# Patient Record
Sex: FEMALE | Race: WHITE | NOT HISPANIC OR LATINO | Employment: FULL TIME | URBAN - METROPOLITAN AREA
[De-identification: names, ages, dates, MRNs, and addresses within clinical notes are randomized per-mention and may not be internally consistent; named-entity substitution may affect disease eponyms.]

---

## 2017-12-28 ENCOUNTER — CONVERSION ENCOUNTER (OUTPATIENT)
Dept: GENERAL RADIOLOGY | Facility: HOSPITAL | Age: 52
End: 2017-12-28

## 2019-03-26 ENCOUNTER — HOSPITAL ENCOUNTER (OUTPATIENT)
Dept: GENERAL RADIOLOGY | Facility: HOSPITAL | Age: 54
Discharge: HOME | End: 2019-03-26

## 2021-05-20 ENCOUNTER — OFFICE VISIT CONVERTED (OUTPATIENT)
Dept: FAMILY MEDICINE CLINIC | Facility: CLINIC | Age: 56
End: 2021-05-20
Attending: PHYSICIAN ASSISTANT

## 2021-05-20 ENCOUNTER — CONVERSION ENCOUNTER (OUTPATIENT)
Dept: FAMILY MEDICINE CLINIC | Facility: CLINIC | Age: 56
End: 2021-05-20

## 2021-05-22 ENCOUNTER — TRANSCRIBE ORDERS (OUTPATIENT)
Dept: FAMILY MEDICINE CLINIC | Facility: CLINIC | Age: 56
End: 2021-05-22

## 2021-05-22 DIAGNOSIS — Z12.31 ENCOUNTER FOR SCREENING MAMMOGRAM FOR BREAST CANCER: Primary | ICD-10-CM

## 2021-06-05 NOTE — PROGRESS NOTES
Progress Note      Patient Name: Lisa Florence   Patient ID: [id number]   Sex: Female   YOB: 1965    Primary Care Provider: Praneeth Schmid PA-C   Referring Provider: Praneeth Schmid PA-C    Visit Date: May 20, 2021    Provider: Praneeth Schmid PA-C   Location: [facility]   Location Address: [address] [place] [address], [address]  [place], [place]  [id number]   Location Phone: [phone]          Chief Complaint  · new patient to establish care  · bilateral ear issues      History Of Present Illness  Lisa Florence is a 56 year old /White female who presents for evaluation and treatment of: new patient to establish care.      pt presents today as new patient to establish care.    pt previous pcp was [facility].    pt has h/o allergies, anemia, IBS, depression, HTN, HLD  and sinus issues.    depression-Celexa 20mg, doing well on medication w/no issues.  HLD-Crestor 5mg, no issues w/medication  HTN-maxzide 75-50mg, doing well on medication w/no issues    pt states she was positive for Covid in 2020. extreme nausea and fatigue, no taste and smell.    Labs 3/19-never medical  Moderna- no issues  mammo 3/19-will schd  PAP -  - normal at [facility]    Pt was giving blood every 2 mos.  But became anemic - now its normal  Reviewed labs on her phone from 2021 - normal CBC, CMP, Lipid  PMH of OBS -C, JAMAL, Depression, no SI/HI, Hyperlipidemia, HTN      No tobacco use, etoh use  Surg - lap neeraj, total hyst (no cancer)    FH - lung cancer - father 66yo    NKDA  Colon - pt refuses now.       Past Medical History  Disease Name Date Onset Notes   Allergies --  --    Anemia --  --    Anxiety --  --    Depression --  --    Hemorrhoid --  --    HTN (hypertension) --  --    Hyperlipemia --  --    IBS (irritable bowel syndrome) --  --    Sinus trouble --  --    Skin Disease --  --          Medication List  Name Date Started Instructions   Celexa 20 mg oral tablet  take  "1 tablet (20 mg) by oral route once daily   cetirizine 10 mg oral tablet  take 1 tablet (10 mg) by oral route once daily   Crestor 5 mg oral tablet  take 1 tablet (5 mg) by oral route once daily   Maxzide 75-50 mg oral tablet  take .5 tablet by oral route once daily       Allergies Reconciled  Social History  Finding Status Start/Stop Quantity Notes   Alcohol Never --/-- --  --    Tobacco Never --/-- --  --          Review of Systems  · Constitutional  o Denies  o : fever, fatigue, weight loss, weight gain  · Cardiovascular  o Denies  o : lower extremity edema, claudication, chest pressure, palpitations  · Respiratory  o Denies  o : shortness of breath, wheezing, cough, hemoptysis, dyspnea on exertion  · Gastrointestinal  o Denies  o : nausea, vomiting, diarrhea, constipation, abdominal pain      Vitals  Date Time BP Position Site L\R Cuff Size HR RR TEMP (F) WT  HT  BMI kg/m2 BSA m2 O2 Sat FR L/min FiO2        05/20/2021 08:08 /72 Sitting    67 - R   160lbs 2oz 5'  2\" 29.29 1.78 98 %            Physical Examination  · Constitutional  o Appearance  o : well developed, well-nourished, no acute distress  · Head and Face  o Head  o : normocephalic, atraumatic  · Ears, Nose, Mouth and Throat  o Ears  o :   § External Ears  § : external auditory canal appearance normal, no discharge present  § Otoscopic Examination  § : tympanic membranes pearly white/gray bilaterally  · Neck  o Inspection/Palpation  o : normal appearance, no masses or tenderness, trachea midline  o Thyroid  o : gland size normal, nontender, no nodules or masses present on palpation  · Respiratory  o Respiratory Effort  o : breathing unlabored  o Inspection of Chest  o : chest rise symmetric bilaterally  o Auscultation of Lungs  o : clear to auscultation bilaterally throughout inspiration and expiration  · Cardiovascular  o Heart  o :   § Auscultation of Heart  § : regular rate and rhythm, no murmurs, gallops or rubs  o Peripheral Vascular " System  o :   § Extremities  § : no edema  · Lymphatic  o Neck  o : no cervical lymphadenopathy, no supraclavicular lymphadenopathy  · Psychiatric  o Mood and Affect  o : mood normal, affect appropriate     SKIN - erythematous scaly rash on bilat auricular areas external meatus           Assessment  · Allergic rhinitis due to allergen     477.9/J30.9  · Depression     311/F32.9  · Dermatitis     692.9/L30.9  · Essential hypertension     401.9/I10  · Hyperlipidemia     272.4/E78.5  · Vitamin D deficiency     268.9/E55.9  · Screening for depression     V79.0/Z13.89  · Visit for screening mammogram     V76.12/Z12.31  · Otalgia     388.70/H92.09      Plan  · Orders  o ACO-18: Negative screen for clinical depression using a standardized tool () - V79.0/Z13.89 - 05/20/2021  o Screening Mammography; Bilateral 3D (33203, 03447, ) - V76.12/Z12.31 - 05/20/2021  o ACO-39: Current medications updated and reviewed (1159F, ) - - 05/20/2021  · Medications  o triamcinolone acetonide 0.1 % topical ointment   SIG: apply a thin layer to the affected area(s) by topical route 2 times per day   DISP: (1) Tube with 0 refills  Prescribed on 05/20/2021     o Vitamin D2 1,250 mcg (50,000 unit) oral capsule   SIG: take 1 capsule by oral route once a week for 90 days   DISP: (13) Capsule with 1 refills  Prescribed on 05/20/2021     o Medications have been Reconciled  o Transition of Care or Provider Policy  · Instructions  o Patient advised to monitor blood pressure (B/P) at home and journal readings. Patient informed that a B/P reading at home of more than 130/80 is considered hypertension. For readings greater xbur826/90 or higher patient is advised to follow up in the office with readings for management. Patient advised to limit sodium intake.  o Patient was educated and given low cholesterol diet information.  o Recommended exercise program to assist with cholesterol, weight loss and overall health improvement.  o Advised  that cheeses and other sources of dairy fats, animal fats, fast food, and the extras (candy, pastries, pies, doughnuts and cookies) all contain LDL raising nutrients. Advised to increase fruits, vegetables, whole grains, and to monitor portion sizes.   o Depression Screen completed and scanned into the EMR under the designated folder within the patient's documents.  o Today's PHQ-9 result is _1__  o Take all medications as prescribed/directed.  o Patient instructed/educated on their diet and exercise program.  o Patient was educated/instructed on their diagnosis, treatment and medications prior to discharge from the clinic today.  o Discussed Covid-19 precautions including, but not limited to, social distancing, avoid touching your face, and hand washing.   · Disposition  o Call or Return if symptoms worsen or persist.  o F/U in 6 months  o Care Transition            Electronically Signed by: Praneeth Schmid PA-C -Author on May 20, 2021 08:37:19 PM

## 2021-07-15 VITALS
HEART RATE: 67 BPM | SYSTOLIC BLOOD PRESSURE: 131 MMHG | OXYGEN SATURATION: 98 % | HEIGHT: 62 IN | BODY MASS INDEX: 29.47 KG/M2 | DIASTOLIC BLOOD PRESSURE: 72 MMHG | WEIGHT: 160.12 LBS

## 2021-09-14 ENCOUNTER — HOSPITAL ENCOUNTER (OUTPATIENT)
Dept: MAMMOGRAPHY | Facility: HOSPITAL | Age: 56
Discharge: HOME | End: 2021-09-14
Admitting: PHYSICIAN ASSISTANT

## 2021-09-14 DIAGNOSIS — Z12.31 ENCOUNTER FOR SCREENING MAMMOGRAM FOR BREAST CANCER: ICD-10-CM

## 2021-09-14 PROCEDURE — 77063 BREAST TOMOSYNTHESIS BI: CPT

## 2021-09-14 PROCEDURE — 77067 SCR MAMMO BI INCL CAD: CPT

## 2021-11-22 ENCOUNTER — LAB (OUTPATIENT)
Dept: LAB | Facility: HOSPITAL | Age: 56
End: 2021-11-22

## 2021-11-22 ENCOUNTER — OFFICE VISIT (OUTPATIENT)
Dept: FAMILY MEDICINE CLINIC | Facility: CLINIC | Age: 56
End: 2021-11-22

## 2021-11-22 VITALS
OXYGEN SATURATION: 97 % | HEIGHT: 62 IN | DIASTOLIC BLOOD PRESSURE: 74 MMHG | BODY MASS INDEX: 31.28 KG/M2 | HEART RATE: 67 BPM | SYSTOLIC BLOOD PRESSURE: 118 MMHG | WEIGHT: 170 LBS

## 2021-11-22 DIAGNOSIS — E66.09 CLASS 1 OBESITY DUE TO EXCESS CALORIES WITH SERIOUS COMORBIDITY AND BODY MASS INDEX (BMI) OF 31.0 TO 31.9 IN ADULT: Chronic | ICD-10-CM

## 2021-11-22 DIAGNOSIS — M25.50 POLYARTHRALGIA: ICD-10-CM

## 2021-11-22 DIAGNOSIS — E78.5 HYPERLIPIDEMIA, UNSPECIFIED HYPERLIPIDEMIA TYPE: Primary | Chronic | ICD-10-CM

## 2021-11-22 DIAGNOSIS — L40.9 PSORIASIS: ICD-10-CM

## 2021-11-22 DIAGNOSIS — F41.9 ANXIETY: Chronic | ICD-10-CM

## 2021-11-22 DIAGNOSIS — E78.5 HYPERLIPIDEMIA, UNSPECIFIED HYPERLIPIDEMIA TYPE: Chronic | ICD-10-CM

## 2021-11-22 DIAGNOSIS — Z11.59 ENCOUNTER FOR HEPATITIS C SCREENING TEST FOR LOW RISK PATIENT: ICD-10-CM

## 2021-11-22 DIAGNOSIS — I10 ESSENTIAL HYPERTENSION: ICD-10-CM

## 2021-11-22 DIAGNOSIS — E55.9 VITAMIN D DEFICIENCY: Chronic | ICD-10-CM

## 2021-11-22 DIAGNOSIS — Z23 FLU VACCINE NEED: ICD-10-CM

## 2021-11-22 DIAGNOSIS — Z12.11 SCREENING FOR COLON CANCER: ICD-10-CM

## 2021-11-22 PROBLEM — K58.9 IBS (IRRITABLE BOWEL SYNDROME): Status: ACTIVE | Noted: 2021-11-22

## 2021-11-22 PROBLEM — F32.A DEPRESSION: Status: ACTIVE | Noted: 2021-11-22

## 2021-11-22 PROBLEM — J30.9 ALLERGIC RHINITIS DUE TO ALLERGEN: Status: ACTIVE | Noted: 2021-05-20

## 2021-11-22 PROBLEM — J34.9 SINUS TROUBLE: Status: ACTIVE | Noted: 2021-11-22

## 2021-11-22 PROBLEM — E66.811 CLASS 1 OBESITY DUE TO EXCESS CALORIES WITH SERIOUS COMORBIDITY AND BODY MASS INDEX (BMI) OF 31.0 TO 31.9 IN ADULT: Status: ACTIVE | Noted: 2021-11-22

## 2021-11-22 PROBLEM — E53.8 B12 DEFICIENCY: Status: ACTIVE | Noted: 2021-11-22

## 2021-11-22 PROBLEM — D64.9 ANEMIA: Status: ACTIVE | Noted: 2021-11-22

## 2021-11-22 LAB
25(OH)D3 SERPL-MCNC: 66.5 NG/ML (ref 30–100)
ALBUMIN SERPL-MCNC: 4 G/DL (ref 3.5–5.2)
ALBUMIN/GLOB SERPL: 1.3 G/DL
ALP SERPL-CCNC: 107 U/L (ref 39–117)
ALT SERPL W P-5'-P-CCNC: 32 U/L (ref 1–33)
ANION GAP SERPL CALCULATED.3IONS-SCNC: 11.9 MMOL/L (ref 5–15)
AST SERPL-CCNC: 33 U/L (ref 1–32)
BASOPHILS # BLD AUTO: 0.03 10*3/MM3 (ref 0–0.2)
BASOPHILS NFR BLD AUTO: 0.5 % (ref 0–1.5)
BILIRUB SERPL-MCNC: 0.3 MG/DL (ref 0–1.2)
BILIRUB UR QL STRIP: NEGATIVE
BUN SERPL-MCNC: 23 MG/DL (ref 6–20)
BUN/CREAT SERPL: 27.1 (ref 7–25)
CALCIUM SPEC-SCNC: 9.4 MG/DL (ref 8.6–10.5)
CHLORIDE SERPL-SCNC: 97 MMOL/L (ref 98–107)
CHOLEST SERPL-MCNC: 180 MG/DL (ref 0–200)
CHROMATIN AB SERPL-ACNC: <10 IU/ML (ref 0–14)
CLARITY UR: CLEAR
CO2 SERPL-SCNC: 27.1 MMOL/L (ref 22–29)
COLOR UR: YELLOW
CREAT SERPL-MCNC: 0.85 MG/DL (ref 0.57–1)
CRP SERPL-MCNC: <0.3 MG/DL (ref 0–0.5)
DEPRECATED RDW RBC AUTO: 40.5 FL (ref 37–54)
EOSINOPHIL # BLD AUTO: 0.12 10*3/MM3 (ref 0–0.4)
EOSINOPHIL NFR BLD AUTO: 1.9 % (ref 0.3–6.2)
ERYTHROCYTE [DISTWIDTH] IN BLOOD BY AUTOMATED COUNT: 12.9 % (ref 12.3–15.4)
GFR SERPL CREATININE-BSD FRML MDRD: 69 ML/MIN/1.73
GLOBULIN UR ELPH-MCNC: 3.2 GM/DL
GLUCOSE SERPL-MCNC: 98 MG/DL (ref 65–99)
GLUCOSE UR STRIP-MCNC: NEGATIVE MG/DL
HCT VFR BLD AUTO: 42.6 % (ref 34–46.6)
HCV AB SER DONR QL: NORMAL
HDLC SERPL-MCNC: 63 MG/DL (ref 40–60)
HGB BLD-MCNC: 13.8 G/DL (ref 12–15.9)
HGB UR QL STRIP.AUTO: NEGATIVE
IMM GRANULOCYTES # BLD AUTO: 0.01 10*3/MM3 (ref 0–0.05)
IMM GRANULOCYTES NFR BLD AUTO: 0.2 % (ref 0–0.5)
KETONES UR QL STRIP: NEGATIVE
LDLC SERPL CALC-MCNC: 108 MG/DL (ref 0–100)
LDLC/HDLC SERPL: 1.72 {RATIO}
LEUKOCYTE ESTERASE UR QL STRIP.AUTO: NEGATIVE
LYMPHOCYTES # BLD AUTO: 2.3 10*3/MM3 (ref 0.7–3.1)
LYMPHOCYTES NFR BLD AUTO: 37.2 % (ref 19.6–45.3)
MCH RBC QN AUTO: 28.1 PG (ref 26.6–33)
MCHC RBC AUTO-ENTMCNC: 32.4 G/DL (ref 31.5–35.7)
MCV RBC AUTO: 86.8 FL (ref 79–97)
MONOCYTES # BLD AUTO: 0.35 10*3/MM3 (ref 0.1–0.9)
MONOCYTES NFR BLD AUTO: 5.7 % (ref 5–12)
NEUTROPHILS NFR BLD AUTO: 3.37 10*3/MM3 (ref 1.7–7)
NEUTROPHILS NFR BLD AUTO: 54.5 % (ref 42.7–76)
NITRITE UR QL STRIP: NEGATIVE
NRBC BLD AUTO-RTO: 0 /100 WBC (ref 0–0.2)
PH UR STRIP.AUTO: 7 [PH] (ref 5–8)
PLATELET # BLD AUTO: 342 10*3/MM3 (ref 140–450)
PMV BLD AUTO: 10 FL (ref 6–12)
POTASSIUM SERPL-SCNC: 4.2 MMOL/L (ref 3.5–5.2)
PROT SERPL-MCNC: 7.2 G/DL (ref 6–8.5)
PROT UR QL STRIP: NEGATIVE
RBC # BLD AUTO: 4.91 10*6/MM3 (ref 3.77–5.28)
SODIUM SERPL-SCNC: 136 MMOL/L (ref 136–145)
SP GR UR STRIP: 1.02 (ref 1–1.03)
TRIGL SERPL-MCNC: 42 MG/DL (ref 0–150)
TSH SERPL DL<=0.05 MIU/L-ACNC: 1.62 UIU/ML (ref 0.27–4.2)
URATE SERPL-MCNC: 5.6 MG/DL (ref 2.4–5.7)
UROBILINOGEN UR QL STRIP: NORMAL
VLDLC SERPL-MCNC: 9 MG/DL (ref 5–40)
WBC NRBC COR # BLD: 6.18 10*3/MM3 (ref 3.4–10.8)

## 2021-11-22 PROCEDURE — 86431 RHEUMATOID FACTOR QUANT: CPT

## 2021-11-22 PROCEDURE — 82306 VITAMIN D 25 HYDROXY: CPT

## 2021-11-22 PROCEDURE — 80053 COMPREHEN METABOLIC PANEL: CPT

## 2021-11-22 PROCEDURE — 90686 IIV4 VACC NO PRSV 0.5 ML IM: CPT | Performed by: PHYSICIAN ASSISTANT

## 2021-11-22 PROCEDURE — 84443 ASSAY THYROID STIM HORMONE: CPT

## 2021-11-22 PROCEDURE — 85025 COMPLETE CBC W/AUTO DIFF WBC: CPT

## 2021-11-22 PROCEDURE — 84550 ASSAY OF BLOOD/URIC ACID: CPT

## 2021-11-22 PROCEDURE — 90471 IMMUNIZATION ADMIN: CPT | Performed by: PHYSICIAN ASSISTANT

## 2021-11-22 PROCEDURE — 36415 COLL VENOUS BLD VENIPUNCTURE: CPT

## 2021-11-22 PROCEDURE — 86225 DNA ANTIBODY NATIVE: CPT

## 2021-11-22 PROCEDURE — 80061 LIPID PANEL: CPT

## 2021-11-22 PROCEDURE — 86038 ANTINUCLEAR ANTIBODIES: CPT

## 2021-11-22 PROCEDURE — 81003 URINALYSIS AUTO W/O SCOPE: CPT

## 2021-11-22 PROCEDURE — 86063 ANTISTREPTOLYSIN O SCREEN: CPT

## 2021-11-22 PROCEDURE — 86140 C-REACTIVE PROTEIN: CPT

## 2021-11-22 PROCEDURE — 99214 OFFICE O/P EST MOD 30 MIN: CPT | Performed by: PHYSICIAN ASSISTANT

## 2021-11-22 PROCEDURE — 86803 HEPATITIS C AB TEST: CPT

## 2021-11-22 RX ORDER — TRIAMTERENE AND HYDROCHLOROTHIAZIDE 75; 50 MG/1; MG/1
0.5 TABLET ORAL DAILY
Qty: 90 TABLET | Refills: 1 | Status: SHIPPED | OUTPATIENT
Start: 2021-11-22 | End: 2023-01-23

## 2021-11-22 RX ORDER — ERGOCALCIFEROL 1.25 MG/1
50000 CAPSULE ORAL
Qty: 90 CAPSULE | Refills: 1 | Status: SHIPPED | OUTPATIENT
Start: 2021-11-22 | End: 2023-01-23

## 2021-11-22 RX ORDER — ROSUVASTATIN CALCIUM 5 MG/1
5 TABLET, COATED ORAL DAILY
Qty: 90 TABLET | Refills: 1 | Status: SHIPPED | OUTPATIENT
Start: 2021-11-22 | End: 2021-11-22 | Stop reason: SDUPTHER

## 2021-11-22 RX ORDER — TRIAMTERENE AND HYDROCHLOROTHIAZIDE 75; 50 MG/1; MG/1
0.5 TABLET ORAL DAILY
COMMUNITY
End: 2021-11-22 | Stop reason: SDUPTHER

## 2021-11-22 RX ORDER — ERGOCALCIFEROL 1.25 MG/1
50000 CAPSULE ORAL
COMMUNITY
End: 2021-11-22 | Stop reason: SDUPTHER

## 2021-11-22 RX ORDER — ROSUVASTATIN CALCIUM 5 MG/1
5 TABLET, COATED ORAL DAILY
Qty: 90 TABLET | Refills: 1 | Status: SHIPPED | OUTPATIENT
Start: 2021-11-22 | End: 2023-01-23

## 2021-11-22 RX ORDER — CITALOPRAM 10 MG/1
10 TABLET ORAL DAILY
Qty: 90 TABLET | Refills: 1 | Status: SHIPPED | OUTPATIENT
Start: 2021-11-22 | End: 2022-04-14

## 2021-11-22 RX ORDER — TRIAMTERENE AND HYDROCHLOROTHIAZIDE 75; 50 MG/1; MG/1
0.5 TABLET ORAL DAILY
Qty: 90 TABLET | Refills: 1 | Status: SHIPPED | OUTPATIENT
Start: 2021-11-22 | End: 2021-11-22 | Stop reason: SDUPTHER

## 2021-11-22 RX ORDER — ERGOCALCIFEROL 1.25 MG/1
50000 CAPSULE ORAL
Qty: 90 CAPSULE | Refills: 1 | Status: SHIPPED | OUTPATIENT
Start: 2021-11-22 | End: 2021-11-22 | Stop reason: SDUPTHER

## 2021-11-22 RX ORDER — ROSUVASTATIN CALCIUM 5 MG/1
5 TABLET, COATED ORAL DAILY
COMMUNITY
End: 2021-11-22 | Stop reason: SDUPTHER

## 2021-11-22 RX ORDER — CETIRIZINE HYDROCHLORIDE 10 MG/1
10 TABLET ORAL DAILY
COMMUNITY
End: 2021-11-22

## 2021-11-22 RX ORDER — CITALOPRAM 20 MG/1
1 TABLET ORAL DAILY
COMMUNITY
End: 2021-11-22

## 2021-11-22 NOTE — PROGRESS NOTES
"Chief Complaint  Hypertension (6 month follow up), Hyperlipidemia, Anxiety, and Depression    Subjective          Lisa Alexandra presents to [facility]  History of Present Illness    Lisa Alexandra is a 56 y.o. female who presents today for a 6 month follow up HTN, HLD, Anxiety, Depression    Pt offers no complaints at this time, she states she is doing well. She is due refills, labs and colonoscopy. Pt has opted for Cologuard instead.     Pt would like to discuss lowering dose of Celexa down to 10mg, she has taken this in the past. Pt is currently on 20mg once daily, she stated she is doing well and doesn't need higher dose.     Labs- due  Mammo-9/2021    Pt will receive flu vaccine in office today.     Answers for HPI/ROS submitted by the patient on 11/15/2021  What is the primary reason for your visit?: Physical      Current Outpatient Medications:   •  ergocalciferol (ERGOCALCIFEROL) 1.25 MG (00502 UT) capsule, Take 1 capsule by mouth Every 7 (Seven) Days., Disp: 90 capsule, Rfl: 1  •  rosuvastatin (Crestor) 5 MG tablet, Take 1 tablet by mouth Daily., Disp: 90 tablet, Rfl: 1  •  triamterene-hydrochlorothiazide (Maxzide) 75-50 MG per tablet, Take 0.5 tablets by mouth Daily., Disp: 90 tablet, Rfl: 1  •  citalopram (CeleXA) 10 MG tablet, Take 1 tablet by mouth Daily., Disp: 90 tablet, Rfl: 1    Objective      Vital Signs:   /74 (BP Location: Left arm)   Pulse 67   Ht 157.5 cm (62\")   Wt 77.1 kg (170 lb)   SpO2 97%   BMI 31.09 kg/m²    Estimated body mass index is 31.09 kg/m² as calculated from the following:    Height as of this encounter: 157.5 cm (62\").    Weight as of this encounter: 77.1 kg (170 lb).     Physical Exam  Vitals and nursing note reviewed.   Constitutional:       Appearance: Normal appearance.   HENT:      Head: Normocephalic and atraumatic.   Cardiovascular:      Rate and Rhythm: Normal rate and regular rhythm.   Pulmonary:      Effort: Pulmonary " effort is normal.      Breath sounds: Normal breath sounds.   Musculoskeletal:      Cervical back: Neck supple.   Skin:     Comments: Skin - erythematous plaque on bilat olecranon process   Neurological:      Mental Status: She is alert.   Psychiatric:         Mood and Affect: Mood normal.         Behavior: Behavior normal.          Result Review :                       Assessment and Plan      Diagnoses and all orders for this visit:    1. Hyperlipidemia, unspecified hyperlipidemia type (Primary)  Comments:  Stable on crestor 5mg daily  Orders:  -     Discontinue: rosuvastatin (Crestor) 5 MG tablet; Take 1 tablet by mouth Daily.  Dispense: 90 tablet; Refill: 1  -     rosuvastatin (Crestor) 5 MG tablet; Take 1 tablet by mouth Daily.  Dispense: 90 tablet; Refill: 1  -     Comprehensive Metabolic Panel; Future  -     Lipid Panel; Future  -     TSH Rfx On Abnormal To Free T4; Future    2. Flu vaccine need  -     FluLaval/Fluarix/Fluzone >6 Months (7904-1504)    3. Screening for colon cancer  -     Cologuard - Stool, Per Rectum; Future    4. Essential hypertension  -     Discontinue: triamterene-hydrochlorothiazide (Maxzide) 75-50 MG per tablet; Take 0.5 tablets by mouth Daily.  Dispense: 90 tablet; Refill: 1  -     triamterene-hydrochlorothiazide (Maxzide) 75-50 MG per tablet; Take 0.5 tablets by mouth Daily.  Dispense: 90 tablet; Refill: 1  -     CBC & Differential; Future  -     Comprehensive Metabolic Panel; Future  -     Lipid Panel; Future  -     TSH Rfx On Abnormal To Free T4; Future  -     Urinalysis With Microscopic If Indicated (No Culture) - Urine, Clean Catch; Future    5. Vitamin D deficiency  Comments:  Stable on vit D 50,000 IU weekly  Orders:  -     Discontinue: ergocalciferol (ERGOCALCIFEROL) 1.25 MG (50371 UT) capsule; Take 1 capsule by mouth Every 7 (Seven) Days.  Dispense: 90 capsule; Refill: 1  -     ergocalciferol (ERGOCALCIFEROL) 1.25 MG (09336 UT) capsule; Take 1 capsule by mouth Every 7 (Seven)  Days.  Dispense: 90 capsule; Refill: 1  -     Vitamin D 25 Hydroxy; Future    6. Class 1 obesity due to excess calories with serious comorbidity and body mass index (BMI) of 31.0 to 31.9 in adult  Comments:  Disc diet and exercise    7. Anxiety  Comments:  Decrease dose of celexa from 20 to 10mg daily  Orders:  -     citalopram (CeleXA) 10 MG tablet; Take 1 tablet by mouth Daily.  Dispense: 90 tablet; Refill: 1    8. Psoriasis  -     Ambulatory Referral to Rheumatology  -     CBC & Differential; Future  -     Uric acid; Future  -     Antistreptolysin O screen; Future  -     Rheumatoid factor; Future  -     C-reactive protein; Future  -     REY; Future    9. Polyarthralgia  -     CBC & Differential; Future  -     Uric acid; Future  -     Antistreptolysin O screen; Future  -     Rheumatoid factor; Future  -     C-reactive protein; Future  -     REY; Future    10. Encounter for hepatitis C screening test for low risk patient  -     Hepatitis C antibody; Future       Follow Up     Return in about 6 months (around 5/22/2022).    I have reviewed information obtained and documented by others and I have confirmed the accuracy of this documented note.     Patient was given instructions and counseling regarding her condition or for health maintenance advice. Please see specific information pulled into the AVS if appropriate.     MADHU Bang

## 2021-11-23 ENCOUNTER — TELEPHONE (OUTPATIENT)
Dept: FAMILY MEDICINE CLINIC | Facility: CLINIC | Age: 56
End: 2021-11-23

## 2021-11-23 LAB
ASO AB SERPL-ACNC: NEGATIVE [IU]/ML
DSDNA IGG SERPL IA-ACNC: NEGATIVE [IU]/ML
NUCLEAR IGG SER IA-RTO: NEGATIVE

## 2021-12-21 ENCOUNTER — TELEPHONE (OUTPATIENT)
Dept: FAMILY MEDICINE CLINIC | Facility: CLINIC | Age: 56
End: 2021-12-21

## 2022-04-14 DIAGNOSIS — F41.9 ANXIETY: Chronic | ICD-10-CM

## 2022-04-14 RX ORDER — CITALOPRAM 10 MG/1
TABLET ORAL
Qty: 90 TABLET | Refills: 0 | Status: SHIPPED | OUTPATIENT
Start: 2022-04-14

## 2022-12-21 ENCOUNTER — TRANSCRIBE ORDERS (OUTPATIENT)
Dept: ADMINISTRATIVE | Facility: HOSPITAL | Age: 57
End: 2022-12-21

## 2022-12-21 DIAGNOSIS — Z78.0 POST-MENOPAUSAL: Primary | ICD-10-CM

## 2022-12-21 DIAGNOSIS — Z12.31 ENCOUNTER FOR SCREENING MAMMOGRAM FOR MALIGNANT NEOPLASM OF BREAST: ICD-10-CM

## 2023-01-24 ENCOUNTER — TELEPHONE (OUTPATIENT)
Dept: ORTHOPEDIC SURGERY | Facility: CLINIC | Age: 58
End: 2023-01-24
Payer: COMMERCIAL

## 2023-01-24 NOTE — TELEPHONE ENCOUNTER
PT SEEN AT [facility] 01/23      PLEASE, ADVISE IT PT CAN BE SEEN Friday OR IF THEY NEED TO COME IN TOMORROW

## 2023-01-27 ENCOUNTER — OFFICE VISIT (OUTPATIENT)
Dept: ORTHOPEDIC SURGERY | Facility: CLINIC | Age: 58
End: 2023-01-27
Payer: COMMERCIAL

## 2023-01-27 VITALS — HEART RATE: 94 BPM | HEIGHT: 62 IN | WEIGHT: 163 LBS | BODY MASS INDEX: 30 KG/M2 | OXYGEN SATURATION: 96 %

## 2023-01-27 DIAGNOSIS — S89.91XA INJURY OF RIGHT KNEE, INITIAL ENCOUNTER: ICD-10-CM

## 2023-01-27 DIAGNOSIS — S83.241A ACUTE MEDIAL MENISCUS TEAR OF RIGHT KNEE, INITIAL ENCOUNTER: Primary | ICD-10-CM

## 2023-01-27 PROCEDURE — 99203 OFFICE O/P NEW LOW 30 MIN: CPT | Performed by: STUDENT IN AN ORGANIZED HEALTH CARE EDUCATION/TRAINING PROGRAM

## 2023-01-27 NOTE — PROGRESS NOTES
"Chief Complaint  Initial Evaluation and Pain of the Right Knee    Subjective          Lisa Alexandra presents to [facility] for   History of Present Illness    Lisa presents today for evaluation of her right knee.  She describes worsening right knee pain over the last few weeks.  She had an episode where the knee popped along the posterior joint line while exercising.  She has a long history of competitive power lifting.  She denies any prior knee trauma or surgeries.  She does describe more chronic anterior type knee pain that is different from her more acute posterior symptoms on the right knee.    No Known Allergies     Social History     Socioeconomic History   • Marital status:    Tobacco Use   • Smoking status: Never   • Smokeless tobacco: Never   Vaping Use   • Vaping Use: Never used   Substance and Sexual Activity   • Alcohol use: Yes     Alcohol/week: 1.0 standard drink     Types: 1 Glasses of wine per week   • Drug use: Never   • Sexual activity: Not Currently     Partners: Male     Birth control/protection: Post-menopausal, Hysterectomy        I reviewed the patient's chief complaint, history of present illness, review of systems, past medical history, surgical history, family history, social history, medications, and allergy list.     REVIEW OF SYSTEMS    Constitutional: Denies fevers, chills, weight loss  Cardiovascular: Denies chest pain, shortness of breath  Skin: Denies rashes, acute skin changes  Neurologic: Denies headache, loss of consciousness  MSK: Right knee pain      Objective   Vital Signs:   Pulse 94   Ht 157.5 cm (62\")   Wt 73.9 kg (163 lb)   SpO2 96%   BMI 29.81 kg/m²     Body mass index is 29.81 kg/m².    Physical Exam    General: Alert. No acute distress.   Right lower extremity: No wounds.  No effusion.  Full extension, flexion to 120 degrees with terminal pain.  Stable to varus and valgus stress.  Stable to Lachman and posterior drawer.  Medial " joint tenderness.  Pain with Marisel's along the medial joint line.  Calf soft.  Distal neurovascular intact.  No pain with hip motion.    Procedures    Imaging Results (Most Recent)     None                   Assessment and Plan        XR Knee 3 View Right    Result Date: 1/23/2023  Narrative: PROCEDURE: XR KNEE 3 VW RIGHT  COMPARISON: None  INDICATIONS: felt a pop in her right knee - posterior/lateral - hurst to bear weight  FINDINGS:  There is no acute fracture or dislocation.  No bony erosion or abnormal periosteal reaction.  No joint effusion.  Soft tissues are unremarkable.      Impression:   1. No acute bony abnormality or significant degenerative change of the right knee.      GISELE HUTTON MD       Electronically Signed and Approved By: GISELE HUTTON MD on 1/23/2023 at 19:24                Diagnoses and all orders for this visit:    1. Acute medial meniscus tear of right knee, initial encounter (Primary)  -     MRI Knee Right Without Contrast; Future    2. Injury of right knee, initial encounter  -     MRI Knee Right Without Contrast; Future        We reviewed the x-rays obtained shortly after her injury.  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  Her current symptoms are more consistent with a medial meniscus tear after her weightlifting injury.  We discussed advanced imaging with an MRI and she was agreeable.  She will follow-up after the MRI to discuss results and additional treatment options.        Call or return if worsening symptoms.    Scribed for Jesse Larson MD by Sejal Holman  01/27/2023   08:45 EST         Follow Up       MRI results    Patient was given instructions and counseling regarding her condition or for health maintenance advice. Please see specific information pulled into the AVS if appropriate.       I have personally performed the services described in this document as scribed by the above individual and it is both accurate and complete.     Jesse Larson  MD  01/27/23  10:25 EST

## 2023-02-08 ENCOUNTER — TELEPHONE (OUTPATIENT)
Dept: ORTHOPEDIC SURGERY | Facility: CLINIC | Age: 58
End: 2023-02-08
Payer: COMMERCIAL

## 2023-02-08 DIAGNOSIS — S89.91XA INJURY OF RIGHT KNEE, INITIAL ENCOUNTER: ICD-10-CM

## 2023-02-08 DIAGNOSIS — S83.241A ACUTE MEDIAL MENISCUS TEAR OF RIGHT KNEE, INITIAL ENCOUNTER: ICD-10-CM

## 2023-02-10 ENCOUNTER — OFFICE VISIT (OUTPATIENT)
Dept: ORTHOPEDIC SURGERY | Facility: CLINIC | Age: 58
End: 2023-02-10
Payer: COMMERCIAL

## 2023-02-10 ENCOUNTER — PREP FOR SURGERY (OUTPATIENT)
Dept: OTHER | Facility: HOSPITAL | Age: 58
End: 2023-02-10
Payer: COMMERCIAL

## 2023-02-10 VITALS — HEART RATE: 63 BPM | HEIGHT: 62 IN | BODY MASS INDEX: 29.44 KG/M2 | OXYGEN SATURATION: 98 % | WEIGHT: 160 LBS

## 2023-02-10 DIAGNOSIS — S83.241A ACUTE MEDIAL MENISCUS TEAR OF RIGHT KNEE, INITIAL ENCOUNTER: Primary | ICD-10-CM

## 2023-02-10 DIAGNOSIS — M94.20 CHONDROMALACIA: ICD-10-CM

## 2023-02-10 PROCEDURE — 99213 OFFICE O/P EST LOW 20 MIN: CPT | Performed by: STUDENT IN AN ORGANIZED HEALTH CARE EDUCATION/TRAINING PROGRAM

## 2023-02-10 RX ORDER — CEFAZOLIN SODIUM 2 G/100ML
2 INJECTION, SOLUTION INTRAVENOUS ONCE
Status: CANCELLED | OUTPATIENT
Start: 2023-02-10 | End: 2023-02-10

## 2023-02-10 RX ORDER — CEFAZOLIN SODIUM IN 0.9 % NACL 3 G/100 ML
3 INTRAVENOUS SOLUTION, PIGGYBACK (ML) INTRAVENOUS ONCE
Status: CANCELLED | OUTPATIENT
Start: 2023-02-10 | End: 2023-02-10

## 2023-02-10 NOTE — PROGRESS NOTES
"Chief Complaint  Follow-up and Pain of the Right Knee    Subjective          Lisa Alexandra presents to [facility] for   History of Present Illness    Lisa returns for follow-up of her right knee.  To review she had a injury to the knee while exercising with a popping sensation along the posterior knee.  Her initial history and exam are concerning for a medial meniscus tear.  An MRI was ordered and obtained.  She returns today to discuss MRI results.      No Known Allergies     Social History     Socioeconomic History   • Marital status:    Tobacco Use   • Smoking status: Never   • Smokeless tobacco: Never   Vaping Use   • Vaping Use: Never used   Substance and Sexual Activity   • Alcohol use: Yes     Alcohol/week: 1.0 standard drink     Types: 1 Glasses of wine per week   • Drug use: Never   • Sexual activity: Not Currently     Partners: Male     Birth control/protection: Post-menopausal, Hysterectomy        I reviewed the patient's chief complaint, history of present illness, review of systems, past medical history, surgical history, family history, social history, medications, and allergy list.     REVIEW OF SYSTEMS    Constitutional: Denies fevers, chills, weight loss  Cardiovascular: Denies chest pain, shortness of breath  Skin: Denies rashes, acute skin changes  Neurologic: Denies headache, loss of consciousness  MSK: Right knee pain      Objective   Vital Signs:   Pulse 63   Ht 157.5 cm (62\")   Wt 72.6 kg (160 lb)   SpO2 98%   BMI 29.26 kg/m²     Body mass index is 29.26 kg/m².    Physical Exam    General: Alert. No acute distress.   Cardiac: Intact peripheral pulses  Pulmonary: Nonlabored respirations  Right lower extremity: Skin intact.  No effusion.  Full extension.  120 degrees of flexion.  Stable to varus and valgus stress.  Stable Lachman and posterior drawer.  Medial joint line tenderness and pain with Marisel's along the medial joint line.  Calf soft.  Distal " neurovascular intact.    Procedures    Imaging Results (Most Recent)     None                   Assessment and Plan        XR Knee 3 View Right    Result Date: 1/23/2023  Narrative: PROCEDURE: XR KNEE 3 VW RIGHT  COMPARISON: None  INDICATIONS: felt a pop in her right knee - posterior/lateral - hurst to bear weight  FINDINGS:  There is no acute fracture or dislocation.  No bony erosion or abnormal periosteal reaction.  No joint effusion.  Soft tissues are unremarkable.      Impression:   1. No acute bony abnormality or significant degenerative change of the right knee.      GISELE HUTTON MD       Electronically Signed and Approved By: GISELE HUTTON MD on 1/23/2023 at 19:24                Diagnoses and all orders for this visit:    1. Acute medial meniscus tear of right knee, initial encounter (Primary)    2. Chondromalacia        We discussed her MRI results.  She does have a posterior horn/root medial meniscus tear and patellar chondromalacia.  We discussed treatment options.  We discussed both operative and nonoperative management.  We discussed the risk, benefits, indications, and alternatives to right knee arthroscopy with partial medial meniscectomy and possible chondroplasty.  She elected to proceed with surgical management.      Discussed surgery., Risks/benefits discussed with patient including, but not limited to: infection, bleeding, neurovascular damage, re-rupture, aesthetic deformity, need for further surgery, and death., Surgery pamphlet given. and Call or return if worsening symptoms.    Scribed for Jesse Larson MD by Jesse Larson MD  02/10/2023   09:09 EST         Follow Up       2-week postop follow-up    Patient was given instructions and counseling regarding her condition or for health maintenance advice. Please see specific information pulled into the AVS if appropriate.       I have personally performed the services described in this document as scribed by the above individual and it is both  accurate and complete.     Jesse Larson MD  02/10/23  09:10 EST

## 2023-02-24 RX ORDER — SACCHAROMYCES BOULARDII 250 MG
250 CAPSULE ORAL 2 TIMES DAILY
COMMUNITY

## 2023-02-28 ENCOUNTER — ANESTHESIA EVENT (OUTPATIENT)
Dept: PERIOP | Facility: HOSPITAL | Age: 58
End: 2023-02-28
Payer: COMMERCIAL

## 2023-02-28 ENCOUNTER — ANESTHESIA (OUTPATIENT)
Dept: PERIOP | Facility: HOSPITAL | Age: 58
End: 2023-02-28
Payer: COMMERCIAL

## 2023-02-28 ENCOUNTER — HOSPITAL ENCOUNTER (OUTPATIENT)
Facility: HOSPITAL | Age: 58
Setting detail: HOSPITAL OUTPATIENT SURGERY
Discharge: HOME | End: 2023-02-28
Attending: STUDENT IN AN ORGANIZED HEALTH CARE EDUCATION/TRAINING PROGRAM | Admitting: STUDENT IN AN ORGANIZED HEALTH CARE EDUCATION/TRAINING PROGRAM
Payer: COMMERCIAL

## 2023-02-28 VITALS
BODY MASS INDEX: 29.62 KG/M2 | SYSTOLIC BLOOD PRESSURE: 97 MMHG | WEIGHT: 160.94 LBS | TEMPERATURE: 97.3 F | HEIGHT: 62 IN | DIASTOLIC BLOOD PRESSURE: 61 MMHG | HEART RATE: 58 BPM | RESPIRATION RATE: 16 BRPM | OXYGEN SATURATION: 96 %

## 2023-02-28 DIAGNOSIS — S83.241A ACUTE MEDIAL MENISCUS TEAR OF RIGHT KNEE, INITIAL ENCOUNTER: ICD-10-CM

## 2023-02-28 LAB — QT INTERVAL: 452 MS

## 2023-02-28 PROCEDURE — 25010000002 ONDANSETRON PER 1 MG: Performed by: STUDENT IN AN ORGANIZED HEALTH CARE EDUCATION/TRAINING PROGRAM

## 2023-02-28 PROCEDURE — 29881 ARTHRS KNE SRG MNISECTMY M/L: CPT | Performed by: PHYSICIAN ASSISTANT

## 2023-02-28 PROCEDURE — 93010 ELECTROCARDIOGRAM REPORT: CPT | Performed by: INTERNAL MEDICINE

## 2023-02-28 PROCEDURE — 25010000002 MIDAZOLAM PER 1MG: Performed by: ANESTHESIOLOGY

## 2023-02-28 PROCEDURE — 25010000002 FENTANYL CITRATE (PF) 50 MCG/ML SOLUTION: Performed by: NURSE ANESTHETIST, CERTIFIED REGISTERED

## 2023-02-28 PROCEDURE — 25010000002 ONDANSETRON PER 1 MG: Performed by: NURSE ANESTHETIST, CERTIFIED REGISTERED

## 2023-02-28 PROCEDURE — 29881 ARTHRS KNE SRG MNISECTMY M/L: CPT | Performed by: STUDENT IN AN ORGANIZED HEALTH CARE EDUCATION/TRAINING PROGRAM

## 2023-02-28 PROCEDURE — 25010000002 PROPOFOL 10 MG/ML EMULSION: Performed by: NURSE ANESTHETIST, CERTIFIED REGISTERED

## 2023-02-28 PROCEDURE — 25010000002 DEXAMETHASONE PER 1 MG: Performed by: NURSE ANESTHETIST, CERTIFIED REGISTERED

## 2023-02-28 PROCEDURE — 93005 ELECTROCARDIOGRAM TRACING: CPT | Performed by: STUDENT IN AN ORGANIZED HEALTH CARE EDUCATION/TRAINING PROGRAM

## 2023-02-28 PROCEDURE — 25010000002 KETOROLAC TROMETHAMINE PER 15 MG: Performed by: NURSE ANESTHETIST, CERTIFIED REGISTERED

## 2023-02-28 PROCEDURE — 25010000002 CEFAZOLIN PER 500 MG: Performed by: STUDENT IN AN ORGANIZED HEALTH CARE EDUCATION/TRAINING PROGRAM

## 2023-02-28 PROCEDURE — 0 MEPERIDINE PER 100 MG: Performed by: NURSE ANESTHETIST, CERTIFIED REGISTERED

## 2023-02-28 RX ORDER — PROPOFOL 10 MG/ML
VIAL (ML) INTRAVENOUS AS NEEDED
Status: DISCONTINUED | OUTPATIENT
Start: 2023-02-28 | End: 2023-02-28 | Stop reason: SURG

## 2023-02-28 RX ORDER — PROMETHAZINE HYDROCHLORIDE 12.5 MG/1
25 TABLET ORAL ONCE AS NEEDED
Status: DISCONTINUED | OUTPATIENT
Start: 2023-02-28 | End: 2023-02-28 | Stop reason: HOSPADM

## 2023-02-28 RX ORDER — DOCUSATE SODIUM 100 MG/1
100 CAPSULE, LIQUID FILLED ORAL 2 TIMES DAILY PRN
Qty: 20 CAPSULE | Refills: 0 | Status: SHIPPED | OUTPATIENT
Start: 2023-02-28

## 2023-02-28 RX ORDER — DEXAMETHASONE SODIUM PHOSPHATE 4 MG/ML
INJECTION, SOLUTION INTRA-ARTICULAR; INTRALESIONAL; INTRAMUSCULAR; INTRAVENOUS; SOFT TISSUE AS NEEDED
Status: DISCONTINUED | OUTPATIENT
Start: 2023-02-28 | End: 2023-02-28 | Stop reason: SURG

## 2023-02-28 RX ORDER — BUPIVACAINE HCL/0.9 % NACL/PF 0.1 %
2 PLASTIC BAG, INJECTION (ML) EPIDURAL ONCE
Status: DISCONTINUED | OUTPATIENT
Start: 2023-02-28 | End: 2023-02-28 | Stop reason: SDUPTHER

## 2023-02-28 RX ORDER — ONDANSETRON 2 MG/ML
4 INJECTION INTRAMUSCULAR; INTRAVENOUS ONCE AS NEEDED
Status: DISCONTINUED | OUTPATIENT
Start: 2023-02-28 | End: 2023-02-28 | Stop reason: HOSPADM

## 2023-02-28 RX ORDER — BUPIVACAINE HYDROCHLORIDE 2.5 MG/ML
INJECTION, SOLUTION EPIDURAL; INFILTRATION; INTRACAUDAL AS NEEDED
Status: DISCONTINUED | OUTPATIENT
Start: 2023-02-28 | End: 2023-02-28 | Stop reason: HOSPADM

## 2023-02-28 RX ORDER — TRAMADOL HYDROCHLORIDE 50 MG/1
50 TABLET ORAL EVERY 4 HOURS PRN
Qty: 30 TABLET | Refills: 0 | Status: SHIPPED | OUTPATIENT
Start: 2023-02-28

## 2023-02-28 RX ORDER — LIDOCAINE HYDROCHLORIDE 20 MG/ML
INJECTION, SOLUTION EPIDURAL; INFILTRATION; INTRACAUDAL; PERINEURAL AS NEEDED
Status: DISCONTINUED | OUTPATIENT
Start: 2023-02-28 | End: 2023-02-28 | Stop reason: SURG

## 2023-02-28 RX ORDER — ONDANSETRON 2 MG/ML
4 INJECTION INTRAMUSCULAR; INTRAVENOUS ONCE AS NEEDED
Status: COMPLETED | OUTPATIENT
Start: 2023-02-28 | End: 2023-02-28

## 2023-02-28 RX ORDER — SODIUM CHLORIDE, SODIUM LACTATE, POTASSIUM CHLORIDE, CALCIUM CHLORIDE 600; 310; 30; 20 MG/100ML; MG/100ML; MG/100ML; MG/100ML
9 INJECTION, SOLUTION INTRAVENOUS CONTINUOUS PRN
Status: DISCONTINUED | OUTPATIENT
Start: 2023-02-28 | End: 2023-02-28 | Stop reason: HOSPADM

## 2023-02-28 RX ORDER — GLYCOPYRROLATE 0.2 MG/ML
0.2 INJECTION INTRAMUSCULAR; INTRAVENOUS
Status: COMPLETED | OUTPATIENT
Start: 2023-02-28 | End: 2023-02-28

## 2023-02-28 RX ORDER — ONDANSETRON 4 MG/1
4 TABLET, FILM COATED ORAL EVERY 8 HOURS PRN
Qty: 30 TABLET | Refills: 0 | Status: SHIPPED | OUTPATIENT
Start: 2023-02-28

## 2023-02-28 RX ORDER — MEPERIDINE HYDROCHLORIDE 25 MG/ML
12.5 INJECTION INTRAMUSCULAR; INTRAVENOUS; SUBCUTANEOUS
Status: DISCONTINUED | OUTPATIENT
Start: 2023-02-28 | End: 2023-02-28 | Stop reason: HOSPADM

## 2023-02-28 RX ORDER — ACETAMINOPHEN 500 MG
1000 TABLET ORAL ONCE
Status: COMPLETED | OUTPATIENT
Start: 2023-02-28 | End: 2023-02-28

## 2023-02-28 RX ORDER — FENTANYL CITRATE 50 UG/ML
INJECTION, SOLUTION INTRAMUSCULAR; INTRAVENOUS AS NEEDED
Status: DISCONTINUED | OUTPATIENT
Start: 2023-02-28 | End: 2023-02-28 | Stop reason: SURG

## 2023-02-28 RX ORDER — MIDAZOLAM HYDROCHLORIDE 2 MG/2ML
2 INJECTION, SOLUTION INTRAMUSCULAR; INTRAVENOUS ONCE
Status: COMPLETED | OUTPATIENT
Start: 2023-02-28 | End: 2023-02-28

## 2023-02-28 RX ORDER — BUPIVACAINE HCL/0.9 % NACL/PF 0.1 %
2 PLASTIC BAG, INJECTION (ML) EPIDURAL ONCE
Status: COMPLETED | OUTPATIENT
Start: 2023-02-28 | End: 2023-02-28

## 2023-02-28 RX ORDER — CEFAZOLIN SODIUM IN 0.9 % NACL 3 G/100 ML
3 INTRAVENOUS SOLUTION, PIGGYBACK (ML) INTRAVENOUS ONCE
Status: DISCONTINUED | OUTPATIENT
Start: 2023-02-28 | End: 2023-02-28 | Stop reason: DRUGHIGH

## 2023-02-28 RX ORDER — MAGNESIUM HYDROXIDE 1200 MG/15ML
LIQUID ORAL AS NEEDED
Status: DISCONTINUED | OUTPATIENT
Start: 2023-02-28 | End: 2023-02-28 | Stop reason: HOSPADM

## 2023-02-28 RX ORDER — PROMETHAZINE HYDROCHLORIDE 25 MG/1
25 SUPPOSITORY RECTAL ONCE AS NEEDED
Status: DISCONTINUED | OUTPATIENT
Start: 2023-02-28 | End: 2023-02-28 | Stop reason: HOSPADM

## 2023-02-28 RX ORDER — KETOROLAC TROMETHAMINE 30 MG/ML
INJECTION, SOLUTION INTRAMUSCULAR; INTRAVENOUS AS NEEDED
Status: DISCONTINUED | OUTPATIENT
Start: 2023-02-28 | End: 2023-02-28 | Stop reason: SURG

## 2023-02-28 RX ORDER — OXYCODONE HYDROCHLORIDE 5 MG/1
5 TABLET ORAL
Status: DISCONTINUED | OUTPATIENT
Start: 2023-02-28 | End: 2023-02-28 | Stop reason: HOSPADM

## 2023-02-28 RX ADMIN — ONDANSETRON 4 MG: 2 INJECTION INTRAMUSCULAR; INTRAVENOUS at 12:59

## 2023-02-28 RX ADMIN — ONDANSETRON 4 MG: 2 INJECTION INTRAMUSCULAR; INTRAVENOUS at 11:29

## 2023-02-28 RX ADMIN — Medication 2 G: at 11:23

## 2023-02-28 RX ADMIN — FENTANYL CITRATE 50 MCG: 50 INJECTION, SOLUTION INTRAMUSCULAR; INTRAVENOUS at 11:23

## 2023-02-28 RX ADMIN — PROPOFOL 160 MG: 10 INJECTION, EMULSION INTRAVENOUS at 11:18

## 2023-02-28 RX ADMIN — GLYCOPYRROLATE 0.2 MG: 0.2 INJECTION INTRAMUSCULAR; INTRAVENOUS at 11:02

## 2023-02-28 RX ADMIN — ACETAMINOPHEN 1000 MG: 500 TABLET ORAL at 10:03

## 2023-02-28 RX ADMIN — KETOROLAC TROMETHAMINE 30 MG: 30 INJECTION, SOLUTION INTRAMUSCULAR; INTRAVENOUS at 11:24

## 2023-02-28 RX ADMIN — MEPERIDINE HYDROCHLORIDE 12.5 MG: 25 INJECTION INTRAMUSCULAR; INTRAVENOUS; SUBCUTANEOUS at 12:14

## 2023-02-28 RX ADMIN — FENTANYL CITRATE 50 MCG: 50 INJECTION, SOLUTION INTRAMUSCULAR; INTRAVENOUS at 11:13

## 2023-02-28 RX ADMIN — LIDOCAINE HYDROCHLORIDE 60 MG: 20 INJECTION, SOLUTION EPIDURAL; INFILTRATION; INTRACAUDAL; PERINEURAL at 11:18

## 2023-02-28 RX ADMIN — OXYCODONE 5 MG: 5 TABLET ORAL at 12:28

## 2023-02-28 RX ADMIN — MIDAZOLAM HYDROCHLORIDE 2 MG: 1 INJECTION, SOLUTION INTRAMUSCULAR; INTRAVENOUS at 11:02

## 2023-02-28 RX ADMIN — SODIUM CHLORIDE, POTASSIUM CHLORIDE, SODIUM LACTATE AND CALCIUM CHLORIDE 9 ML/HR: 600; 310; 30; 20 INJECTION, SOLUTION INTRAVENOUS at 10:03

## 2023-02-28 RX ADMIN — DEXAMETHASONE SODIUM PHOSPHATE 8 MG: 4 INJECTION, SOLUTION INTRA-ARTICULAR; INTRALESIONAL; INTRAMUSCULAR; INTRAVENOUS; SOFT TISSUE at 11:24

## 2023-02-28 NOTE — ANESTHESIA PREPROCEDURE EVALUATION
Anesthesia Evaluation     Patient summary reviewed and Nursing notes reviewed   NPO Solid Status: > 6 hours  NPO Liquid Status: > 6 hours           Airway   Mallampati: II  TM distance: <3 FB  Dental      Pulmonary - negative pulmonary ROS and normal exam   Cardiovascular - normal exam  Exercise tolerance: good (4-7 METS)    (+) hypertension, hyperlipidemia,       Neuro/Psych  GI/Hepatic/Renal/Endo - negative ROS     Musculoskeletal     Abdominal    Substance History      OB/GYN          Other                        Anesthesia Plan    ASA 2     general     intravenous induction     Anesthetic plan, risks, benefits, and alternatives have been provided, discussed and informed consent has been obtained with: patient.        CODE STATUS:

## 2023-02-28 NOTE — ANESTHESIA POSTPROCEDURE EVALUATION
Patient: Lisa Alexandra    Procedure Summary     Date: 02/28/23 Room / Location: [facility]  / [facility]    Anesthesia Start: 1113 Anesthesia Stop:     Procedure: KNEE ARTHROSCOPY WITH PARTIAL MEDIAL MENISCECTOMY, CHONDROPLASTY (Right) Diagnosis:       Acute medial meniscus tear of right knee, initial encounter      (Acute medial meniscus tear of right knee, initial encounter [S83.241A])    Surgeons: Jesse Larson MD Provider: Basil Parker CRNA    Anesthesia Type: general ASA Status: 2          Anesthesia Type: general    Vitals  Vitals Value Taken Time   BP 77/45 02/28/23 1210   Temp 36.1 °C (97 °F) 02/28/23 1158   Pulse 62 02/28/23 1213   Resp 16 02/28/23 1203   SpO2 98 % 02/28/23 1213   Vitals shown include unvalidated device data.        Post Anesthesia Care and Evaluation    Patient location during evaluation: bedside  Patient participation: complete - patient participated  Level of consciousness: awake  Pain management: adequate    Airway patency: patent  Anesthetic complications: No anesthetic complications  PONV Status: none  Cardiovascular status: acceptable and stable  Respiratory status: acceptable  Hydration status: acceptable    Comments: An Anesthesiologist personally participated in the most demanding procedures (including induction and emergence if applicable) in the anesthesia plan, monitored the course of anesthesia administration at frequent intervals and remained physically present and available for immediate diagnosis and treatment of emergencies.

## 2023-03-13 ENCOUNTER — OFFICE VISIT (OUTPATIENT)
Dept: ORTHOPEDIC SURGERY | Facility: CLINIC | Age: 58
End: 2023-03-13
Payer: COMMERCIAL

## 2023-03-13 VITALS — BODY MASS INDEX: 29.44 KG/M2 | WEIGHT: 160 LBS | HEIGHT: 62 IN

## 2023-03-13 DIAGNOSIS — S83.241D ACUTE MEDIAL MENISCUS TEAR OF RIGHT KNEE, SUBSEQUENT ENCOUNTER: Primary | ICD-10-CM

## 2023-03-13 DIAGNOSIS — M94.20 CHONDROMALACIA: ICD-10-CM

## 2023-03-13 PROCEDURE — 99024 POSTOP FOLLOW-UP VISIT: CPT | Performed by: PHYSICIAN ASSISTANT

## 2023-03-13 NOTE — PROGRESS NOTES
"Chief Complaint  Pain and Follow-up of the Right Knee    Subjective          Lisa Alexandra presents to [facility]   History of Present Illness     Lisa Alexandra presents today for a follow up of her right knee. Patient is 2 weeks post operative right knee arthroscopy with partial medial meniscectomy and chondroplasty performed by Dr. Larson on 2/28/2023. Today, patient states that she is doing well.  She reports no pain to her knee.  She reports minimal swelling.  Denies complications, redness, drainage from her incision sites.  Denies fever or chills.  She is not having to take any medications for pain.  She states her home exercises are going well.  She reports no new concerns.      No Known Allergies     Social History     Socioeconomic History   • Marital status:    Tobacco Use   • Smoking status: Never   • Smokeless tobacco: Never   Vaping Use   • Vaping Use: Never used   Substance and Sexual Activity   • Alcohol use: Not Currently     Alcohol/week: 1.0 standard drink     Types: 1 Glasses of wine per week   • Drug use: Never   • Sexual activity: Defer     Partners: Male     Birth control/protection: Post-menopausal, Hysterectomy        I reviewed the patient's chief complaint, history of present illness, review of systems, past medical history, surgical history, family history, social history, medications, and allergy list.     REVIEW OF SYSTEMS    Constitutional: Denies fevers, chills, weight loss  Cardiovascular: Denies chest pain, shortness of breath  Skin: Denies rashes, acute skin changes  Neurologic: Denies headache, loss of consciousness  MSK: Right knee pain      Objective   Vital Signs:   Ht 157.5 cm (62\")   Wt 72.6 kg (160 lb)   BMI 29.26 kg/m²     Body mass index is 29.26 kg/m².    Physical Exam    General: Alert, no acute distress  Right lower extremity: Sutures removed today without complications.  Well-healing arthroscopy portals about the right knee. No " redness or active drainage noted.  Minimal knee effusion.  No areas of tenderness.  Full knee extension.  120 degrees of flexion. Knee extensor mechanism intact. Knee stable to varus and valgus stress. Calf soft, nontender. Demonstrates active ankle plantarflexion and dorsiflexion. Sensation intact over the dorsal and plantar foot.  Palpable pedal pulses.        Procedures    Imaging Results (Most Recent)     None                   Assessment and Plan    Diagnoses and all orders for this visit:    1. Acute medial meniscus tear of right knee, subsequent encounter (Primary)    2. Chondromalacia         Lisa Alexandra presents today 2 weeks postop right knee arthroscopy with partial medial meniscectomy and chondroplasty performed by Dr. Larson on 2/20/2023.  Sutures removed today without complications.  Incisions are well-healing.  No active drainage or redness noted.  No concerning signs of infection.  Patient is doing well and denies fever or chills.  Incision site care was reviewed today. Patient instructed not to soak or submerge incision. Do not apply any lotions, creams, or ointments to the incision at this time.  We discussed concerning signs and symptoms regarding the incision site.  Patient understood and agreed. We discussed formal physical therapy versus home exercises.  Patient understood and elected to proceed with home exercises.  We discussed the importance of these exercises to improve range of motion and strength.  Patient understood and agreed.  Use ice and elevation as needed for inflammation.    Patient will follow up in 4 weeks for reevaluation. No new x-rays needed at next visit.       Call or return if symptoms worsen or patient has any concerns.       Follow Up   Return in about 4 weeks (around 4/10/2023).  Patient was given instructions and counseling regarding her condition or for health maintenance advice. Please see specific information pulled into the AVS if appropriate.     Symone  MARY BETH Xie  03/13/23  15:49 EDT

## 2023-03-22 ENCOUNTER — HOSPITAL ENCOUNTER (OUTPATIENT)
Dept: BONE DENSITY | Facility: HOSPITAL | Age: 58
Discharge: HOME | End: 2023-03-22
Payer: COMMERCIAL

## 2023-03-22 ENCOUNTER — HOSPITAL ENCOUNTER (OUTPATIENT)
Dept: MAMMOGRAPHY | Facility: HOSPITAL | Age: 58
Discharge: HOME | End: 2023-03-22
Payer: COMMERCIAL

## 2023-03-22 DIAGNOSIS — Z12.31 ENCOUNTER FOR SCREENING MAMMOGRAM FOR MALIGNANT NEOPLASM OF BREAST: ICD-10-CM

## 2023-03-22 DIAGNOSIS — Z78.0 POST-MENOPAUSAL: ICD-10-CM

## 2023-03-22 PROCEDURE — 77080 DXA BONE DENSITY AXIAL: CPT

## 2023-03-22 PROCEDURE — 77067 SCR MAMMO BI INCL CAD: CPT

## 2023-03-22 PROCEDURE — 77063 BREAST TOMOSYNTHESIS BI: CPT

## 2023-04-14 ENCOUNTER — OFFICE VISIT (OUTPATIENT)
Dept: ORTHOPEDIC SURGERY | Facility: CLINIC | Age: 58
End: 2023-04-14
Payer: COMMERCIAL

## 2023-04-14 VITALS — WEIGHT: 160 LBS | HEIGHT: 62 IN | OXYGEN SATURATION: 97 % | HEART RATE: 82 BPM | BODY MASS INDEX: 29.44 KG/M2

## 2023-04-14 DIAGNOSIS — S83.241D ACUTE MEDIAL MENISCUS TEAR OF RIGHT KNEE, SUBSEQUENT ENCOUNTER: Primary | ICD-10-CM

## 2023-04-14 PROCEDURE — 99024 POSTOP FOLLOW-UP VISIT: CPT | Performed by: PHYSICIAN ASSISTANT

## 2023-04-14 NOTE — PROGRESS NOTES
"Chief Complaint  Follow-up of the Right Knee    Subjective          Lisa Alexandra presents to [facility]   History of Present Illness    Lisa Alexandra presents today for a follow-up of her right knee.  Patient is 6 weeks postop right knee arthroscopy performed on 2/20/2023.  Today, patient states her right knee is doing well. She reports good range of motion and strength. States that her incisions are well healed. She has been doing her home exercises. She describes popping at time without pain. Reports minimal swelling. Denies new injuries.       No Known Allergies     Social History     Socioeconomic History   • Marital status:    Tobacco Use   • Smoking status: Never   • Smokeless tobacco: Never   Vaping Use   • Vaping Use: Never used   Substance and Sexual Activity   • Alcohol use: Not Currently     Alcohol/week: 1.0 standard drink     Types: 1 Glasses of wine per week   • Drug use: Never   • Sexual activity: Defer     Partners: Male     Birth control/protection: Post-menopausal, Hysterectomy        I reviewed the patient's chief complaint, history of present illness, review of systems, past medical history, surgical history, family history, social history, medications, and allergy list.     REVIEW OF SYSTEMS    Constitutional: Denies fevers, chills, weight loss  Cardiovascular: Denies chest pain, shortness of breath  Skin: Denies rashes, acute skin changes  Neurologic: Denies headache, loss of consciousness  MSK: Right knee pain      Objective   Vital Signs:   Pulse 82   Ht 157.5 cm (62\")   Wt 72.6 kg (160 lb)   SpO2 97%   BMI 29.26 kg/m²     Body mass index is 29.26 kg/m².    Physical Exam    General: Alert. No acute distress.   Right lower extremity: Incisions are well healed without concerning signs for infection. Minimal swelling. Knee extensor mechanism intact. Knee stable to varus and valgus stress. Full knee extension. Knee flexion 120. Stable to anterior and " posterior drawer. No pain with Marisel testing. Calf soft, nontender. No pain with passive hip range of motion. Demonstrates active ankle plantarflexion and dorsiflexion. Sensation intact over the dorsal and plantar foot. Palpable pedal pulses.     Procedures    Imaging Results (Most Recent)     None               Assessment and Plan    Diagnoses and all orders for this visit:    1. Acute medial meniscus tear of right knee, subsequent encounter (Primary)        Lisa Alexandra presents today 6 weeks postop right knee arthroscopy performed on 2/20/2023. Incisions are well healed without concerning signs for infection. Patient instructed to continue with home exercises for range of motion and strength. Okay to gradually increase activities as tolerated.      Patient will follow up in 6 weeks for reevaluation.       Call or return if symptoms worsen or patient has any concerns.       Follow Up   Return in about 6 weeks (around 5/26/2023).  Patient was given instructions and counseling regarding her condition or for health maintenance advice. Please see specific information pulled into the AVS if appropriate.     Symone Xie PA-C  04/14/23  11:19 EDT

## 2023-05-26 ENCOUNTER — OFFICE VISIT (OUTPATIENT)
Dept: ORTHOPEDIC SURGERY | Facility: CLINIC | Age: 58
End: 2023-05-26
Payer: COMMERCIAL

## 2023-05-26 VITALS — BODY MASS INDEX: 28.49 KG/M2 | WEIGHT: 154.8 LBS | HEIGHT: 62 IN

## 2023-05-26 DIAGNOSIS — S83.241D ACUTE MEDIAL MENISCUS TEAR OF RIGHT KNEE, SUBSEQUENT ENCOUNTER: Primary | ICD-10-CM

## 2023-05-26 NOTE — PROGRESS NOTES
"Chief Complaint  Follow-up of the Right Knee    Subjective          Lisa Alexandra presents to [facility]   History of Present Illness    Lisa Alexandra presents today for a follow-up of her right knee.  Patient is 3 months postop right knee arthroscopy performed on 2/20/2023.  Today, patient states that she is doing well.  She reports good range of motion and strength.  She reports no pain to her knee.  Denies swelling.  Denies new injuries.  She is continue with the gym without complications.      No Known Allergies     Social History     Socioeconomic History   • Marital status:    Tobacco Use   • Smoking status: Never   • Smokeless tobacco: Never   Vaping Use   • Vaping Use: Never used   Substance and Sexual Activity   • Alcohol use: Not Currently     Alcohol/week: 1.0 standard drink     Types: 1 Glasses of wine per week   • Drug use: Never   • Sexual activity: Defer     Partners: Male     Birth control/protection: Post-menopausal, Hysterectomy        I reviewed the patient's chief complaint, history of present illness, review of systems, past medical history, surgical history, family history, social history, medications, and allergy list.     REVIEW OF SYSTEMS    Constitutional: Denies fevers, chills, weight loss  Cardiovascular: Denies chest pain, shortness of breath  Skin: Denies rashes, acute skin changes  Neurologic: Denies headache, loss of consciousness  MSK: Right knee pain      Objective   Vital Signs:   Ht 157.5 cm (62\")   Wt 70.2 kg (154 lb 12.8 oz)   BMI 28.31 kg/m²     Body mass index is 28.31 kg/m².    Physical Exam    General: Alert. No acute distress.   Right lower extremity: Incisions are well-healed.  No concerning signs for infection.  Full knee extension.  Knee flexion 120.  Knee extensor mechanism intact.  Nontender to medial or lateral joint lines.  No effusion.  No pain with Marisel testing.  Knee stable to varus and valgus stress.  Calf soft, " nontender.  Demonstrates active ankle plantarflexion and dorsiflexion.  No pain with passive hip range of motion.  Sensation intact over the dorsal and plantar foot.  Palpable pedal pulses.    Procedures    Imaging Results (Most Recent)     None                 Assessment and Plan    Diagnoses and all orders for this visit:    1. Acute medial meniscus tear of right knee, subsequent encounter (Primary)        Lisa Alexandra presents today 3 months postop right knee arthroscopy performed on 2/20/2023.  Incisions are well-healed.  Patient directed to continue with exercises.  Continue progression with activity as tolerated.      Patient will follow up as needed.      Call or return if symptoms worsen or patient has any concerns.       Follow Up   Return if symptoms worsen or fail to improve.  Patient was given instructions and counseling regarding her condition or for health maintenance advice. Please see specific information pulled into the AVS if appropriate.     Symone Xie PA-C  05/26/23  10:18 EDT

## 2023-08-10 ENCOUNTER — HOSPITAL ENCOUNTER (OUTPATIENT)
Dept: INTERVENTIONAL RADIOLOGY/VASCULAR | Facility: HOSPITAL | Age: 58
Discharge: HOME | End: 2023-08-10
Payer: COMMERCIAL

## 2023-08-10 DIAGNOSIS — M25.552 LEFT HIP PAIN: ICD-10-CM

## 2023-08-10 PROCEDURE — 25010000002 BUPIVACAINE (PF) 0.5 % SOLUTION: Performed by: STUDENT IN AN ORGANIZED HEALTH CARE EDUCATION/TRAINING PROGRAM

## 2023-08-10 PROCEDURE — 77002 NEEDLE LOCALIZATION BY XRAY: CPT

## 2023-08-10 PROCEDURE — 25010000002 METHYLPREDNISOLONE PER 80 MG: Performed by: STUDENT IN AN ORGANIZED HEALTH CARE EDUCATION/TRAINING PROGRAM

## 2023-08-10 PROCEDURE — 25510000001 IOPAMIDOL 61 % SOLUTION: Performed by: STUDENT IN AN ORGANIZED HEALTH CARE EDUCATION/TRAINING PROGRAM

## 2023-08-10 RX ORDER — BUPIVACAINE HYDROCHLORIDE 5 MG/ML
5 INJECTION, SOLUTION EPIDURAL; INTRACAUDAL ONCE
Status: COMPLETED | OUTPATIENT
Start: 2023-08-10 | End: 2023-08-10

## 2023-08-10 RX ORDER — LIDOCAINE HYDROCHLORIDE 20 MG/ML
20 INJECTION, SOLUTION INFILTRATION; PERINEURAL ONCE
Status: COMPLETED | OUTPATIENT
Start: 2023-08-10 | End: 2023-08-10

## 2023-08-10 RX ORDER — METHYLPREDNISOLONE ACETATE 80 MG/ML
80 INJECTION, SUSPENSION INTRA-ARTICULAR; INTRALESIONAL; INTRAMUSCULAR; SOFT TISSUE ONCE
Status: COMPLETED | OUTPATIENT
Start: 2023-08-10 | End: 2023-08-10

## 2023-08-10 RX ADMIN — IOPAMIDOL 1 ML: 612 INJECTION, SOLUTION INTRATHECAL at 13:30

## 2023-08-10 RX ADMIN — LIDOCAINE HYDROCHLORIDE 9 ML: 20 INJECTION, SOLUTION INFILTRATION; PERINEURAL at 13:30

## 2023-08-10 RX ADMIN — BUPIVACAINE HYDROCHLORIDE 4 ML: 5 INJECTION, SOLUTION EPIDURAL; INTRACAUDAL; PERINEURAL at 13:30

## 2023-08-10 RX ADMIN — SODIUM BICARBONATE 1 ML: 84 INJECTION, SOLUTION INTRAVENOUS at 13:30

## 2023-08-10 RX ADMIN — METHYLPREDNISOLONE ACETATE 80 MG: 80 INJECTION, SUSPENSION INTRA-ARTICULAR; INTRALESIONAL; INTRAMUSCULAR; SOFT TISSUE at 13:30

## 2023-09-25 ENCOUNTER — OFFICE VISIT (OUTPATIENT)
Dept: ORTHOPEDIC SURGERY | Facility: CLINIC | Age: 58
End: 2023-09-25

## 2023-09-25 VITALS
HEART RATE: 68 BPM | WEIGHT: 158 LBS | DIASTOLIC BLOOD PRESSURE: 86 MMHG | HEIGHT: 62 IN | OXYGEN SATURATION: 98 % | SYSTOLIC BLOOD PRESSURE: 136 MMHG | BODY MASS INDEX: 29.08 KG/M2

## 2023-09-25 DIAGNOSIS — M16.12 PRIMARY OSTEOARTHRITIS OF LEFT HIP: Primary | ICD-10-CM

## 2023-09-25 RX ORDER — MELOXICAM 7.5 MG/1
7.5 TABLET ORAL DAILY
Qty: 30 TABLET | Refills: 0 | Status: SHIPPED | OUTPATIENT
Start: 2023-09-25

## 2023-09-25 NOTE — PROGRESS NOTES
"Chief Complaint  Follow-up and Pain of the Left Hip    Subjective          Lisa Florence presents to [facility] for   History of Present Illness    The patient presents here today for follow up evaluation of the left hip. The patient has been treating her left hip osteoarthritis conservatively. She had an injection in the hip for about 3 1/2 weeks of relief. She reports the pain is returning.     No Known Allergies     Social History     Socioeconomic History    Marital status:    Tobacco Use    Smoking status: Never    Smokeless tobacco: Never   Vaping Use    Vaping Use: Never used   Substance and Sexual Activity    Alcohol use: Not Currently     Alcohol/week: 1.0 standard drink     Types: 1 Glasses of wine per week    Drug use: Never    Sexual activity: Defer     Partners: Male     Birth control/protection: Post-menopausal, Hysterectomy        I reviewed the patient's chief complaint, history of present illness, review of systems, past medical history, surgical history, family history, social history, medications, and allergy list.     REVIEW OF SYSTEMS    Constitutional: Denies fevers, chills, weight loss  Cardiovascular: Denies chest pain, shortness of breath  Skin: Denies rashes, acute skin changes  Neurologic: Denies headache, loss of consciousness  MSK: left hip pain      Objective   Vital Signs:   /86   Pulse 68   Ht 157.5 cm (62\")   Wt 71.7 kg (158 lb)   SpO2 98%   BMI 28.90 kg/m²     Body mass index is 28.9 kg/m².    Physical Exam    General: Alert. No acute distress.   Left hip-  flexion 90, internal rotation 5, External Rotation 20. 5/5 hip Abduction. Positive EHL, FHL, GS and TA. Sensation intact to all 5 nerves of the foot. Positive pulses. Neurovascularly intact.      Procedures    Imaging Results (Most Recent)       None                     Assessment and Plan        No results found.     Diagnoses and all orders for this visit:    1. Primary " osteoarthritis of left hip (Primary)        Discussed the treatment plan with the patient.  Plan to continue conservative treatment at this time. She can have repeat injections every 3 months. Plan to discontinue diclofenac. Prescription for mobic given today.       Call or return if worsening symptoms.    Scribed for Jesse Larson MD by Jenny Salguero  09/25/2023   08:18 EDT         Follow Up       6 weeks    Patient was given instructions and counseling regarding her condition or for health maintenance advice. Please see specific information pulled into the AVS if appropriate.       I have personally performed the services described in this document as scribed by the above individual and it is both accurate and complete.     Jesse Larson MD  09/25/23  08:21 EDT

## 2023-11-08 ENCOUNTER — OFFICE VISIT (OUTPATIENT)
Dept: ORTHOPEDIC SURGERY | Facility: CLINIC | Age: 58
End: 2023-11-08
Payer: COMMERCIAL

## 2023-11-08 VITALS
HEART RATE: 62 BPM | WEIGHT: 152 LBS | OXYGEN SATURATION: 99 % | BODY MASS INDEX: 27.97 KG/M2 | HEIGHT: 62 IN | SYSTOLIC BLOOD PRESSURE: 125 MMHG | DIASTOLIC BLOOD PRESSURE: 80 MMHG

## 2023-11-08 DIAGNOSIS — M16.12 PRIMARY OSTEOARTHRITIS OF LEFT HIP: Primary | ICD-10-CM

## 2023-11-08 NOTE — PROGRESS NOTES
"Chief Complaint  Follow-up and Pain of the Left Hip    Subjective          Lisa Florence presents to [facility] for   History of Present Illness    The patient presents here today for follow up evaluation of the left hip. The patient has been treating her left hip osteoarthritis conservatively. She had an injection by radiology in the past with relief. She denies relief with mobic or diclofenac.     No Known Allergies     Social History     Socioeconomic History    Marital status:    Tobacco Use    Smoking status: Never    Smokeless tobacco: Never   Vaping Use    Vaping Use: Never used   Substance and Sexual Activity    Alcohol use: Not Currently     Alcohol/week: 1.0 standard drink of alcohol     Types: 1 Glasses of wine per week    Drug use: Never    Sexual activity: Defer     Partners: Male     Birth control/protection: Post-menopausal, Hysterectomy        I reviewed the patient's chief complaint, history of present illness, review of systems, past medical history, surgical history, family history, social history, medications, and allergy list.     REVIEW OF SYSTEMS    Constitutional: Denies fevers, chills, weight loss  Cardiovascular: Denies chest pain, shortness of breath  Skin: Denies rashes, acute skin changes  Neurologic: Denies headache, loss of consciousness  MSK: Left hip pain      Objective   Vital Signs:   /80   Pulse 62   Ht 157.5 cm (62\")   Wt 68.9 kg (152 lb)   SpO2 99%   BMI 27.80 kg/m²     Body mass index is 27.8 kg/m².    Physical Exam    General: Alert. No acute distress.   Left hip- flexion 90, internal rotation 5, External Rotation 20. 5/5 hip Abduction. Positive EHL, FHL, GS and TA. Sensation intact to all 5 nerves of the foot. Positive pulses. Neurovascularly intact.       Procedures    Imaging Results (Most Recent)       None                     Assessment and Plan        No results found.     Diagnoses and all orders for this visit:    1. " Primary osteoarthritis of left hip (Primary)        Discussed the treatment plan with the patient.  Plan to discontinue mobic and proceed with taking ibuprofen. Order for repeat intra-articular hip injection given today. Activity modification discussed.        Call or return if worsening symptoms.    Scribed for Jesse Larson MD by Jenny Salguero  11/08/2023   08:04 EST         Follow Up       3 months    Patient was given instructions and counseling regarding her condition or for health maintenance advice. Please see specific information pulled into the AVS if appropriate.       I have personally performed the services described in this document as scribed by the above individual and it is both accurate and complete.     Jesse Larson MD  11/08/23  08:10 EST

## 2023-12-04 ENCOUNTER — HOSPITAL ENCOUNTER (OUTPATIENT)
Dept: INTERVENTIONAL RADIOLOGY/VASCULAR | Facility: HOSPITAL | Age: 58
Discharge: HOME | End: 2023-12-04
Admitting: STUDENT IN AN ORGANIZED HEALTH CARE EDUCATION/TRAINING PROGRAM
Payer: COMMERCIAL

## 2023-12-04 DIAGNOSIS — M16.12 PRIMARY OSTEOARTHRITIS OF LEFT HIP: ICD-10-CM

## 2023-12-04 PROCEDURE — 25510000001 IOPAMIDOL 61 % SOLUTION: Performed by: STUDENT IN AN ORGANIZED HEALTH CARE EDUCATION/TRAINING PROGRAM

## 2023-12-04 PROCEDURE — 25010000002 METHYLPREDNISOLONE PER 80 MG: Performed by: STUDENT IN AN ORGANIZED HEALTH CARE EDUCATION/TRAINING PROGRAM

## 2023-12-04 PROCEDURE — 25010000002 BUPIVACAINE (PF) 0.5 % SOLUTION: Performed by: STUDENT IN AN ORGANIZED HEALTH CARE EDUCATION/TRAINING PROGRAM

## 2023-12-04 PROCEDURE — 77002 NEEDLE LOCALIZATION BY XRAY: CPT

## 2023-12-04 RX ORDER — BUPIVACAINE HYDROCHLORIDE 5 MG/ML
5 INJECTION, SOLUTION EPIDURAL; INTRACAUDAL ONCE
Status: COMPLETED | OUTPATIENT
Start: 2023-12-04 | End: 2023-12-04

## 2023-12-04 RX ORDER — METHYLPREDNISOLONE ACETATE 80 MG/ML
80 INJECTION, SUSPENSION INTRA-ARTICULAR; INTRALESIONAL; INTRAMUSCULAR; SOFT TISSUE ONCE
Status: COMPLETED | OUTPATIENT
Start: 2023-12-04 | End: 2023-12-04

## 2023-12-04 RX ORDER — IOPAMIDOL 612 MG/ML
15 INJECTION, SOLUTION INTRATHECAL
Status: COMPLETED | OUTPATIENT
Start: 2023-12-04 | End: 2023-12-04

## 2023-12-04 RX ORDER — LIDOCAINE HYDROCHLORIDE 20 MG/ML
20 INJECTION, SOLUTION INFILTRATION; PERINEURAL ONCE
Status: COMPLETED | OUTPATIENT
Start: 2023-12-04 | End: 2023-12-04

## 2023-12-04 RX ADMIN — BUPIVACAINE HYDROCHLORIDE 4 ML: 5 INJECTION, SOLUTION EPIDURAL; INTRACAUDAL; PERINEURAL at 13:20

## 2023-12-04 RX ADMIN — LIDOCAINE HYDROCHLORIDE 9 ML: 20 INJECTION, SOLUTION INFILTRATION; PERINEURAL at 13:20

## 2023-12-04 RX ADMIN — IOPAMIDOL 1 ML: 612 INJECTION, SOLUTION INTRATHECAL at 13:20

## 2023-12-04 RX ADMIN — SODIUM BICARBONATE 1 ML: 84 INJECTION, SOLUTION INTRAVENOUS at 13:20

## 2023-12-04 RX ADMIN — METHYLPREDNISOLONE ACETATE 80 MG: 80 INJECTION, SUSPENSION INTRA-ARTICULAR; INTRALESIONAL; INTRAMUSCULAR; SOFT TISSUE at 13:20

## 2024-02-12 ENCOUNTER — OFFICE VISIT (OUTPATIENT)
Dept: ORTHOPEDIC SURGERY | Facility: CLINIC | Age: 59
End: 2024-02-12
Payer: COMMERCIAL

## 2024-02-12 VITALS
HEART RATE: 68 BPM | BODY MASS INDEX: 29.88 KG/M2 | SYSTOLIC BLOOD PRESSURE: 126 MMHG | DIASTOLIC BLOOD PRESSURE: 78 MMHG | WEIGHT: 162.4 LBS | HEIGHT: 62 IN | OXYGEN SATURATION: 97 %

## 2024-02-12 DIAGNOSIS — M16.12 PRIMARY OSTEOARTHRITIS OF LEFT HIP: Primary | ICD-10-CM

## 2024-02-12 PROCEDURE — 99213 OFFICE O/P EST LOW 20 MIN: CPT | Performed by: STUDENT IN AN ORGANIZED HEALTH CARE EDUCATION/TRAINING PROGRAM

## 2024-04-02 ENCOUNTER — HOSPITAL ENCOUNTER (OUTPATIENT)
Dept: INTERVENTIONAL RADIOLOGY/VASCULAR | Facility: HOSPITAL | Age: 59
Discharge: HOME | End: 2024-04-02
Admitting: STUDENT IN AN ORGANIZED HEALTH CARE EDUCATION/TRAINING PROGRAM
Payer: COMMERCIAL

## 2024-04-02 DIAGNOSIS — M16.12 PRIMARY OSTEOARTHRITIS OF LEFT HIP: ICD-10-CM

## 2024-04-02 PROCEDURE — 25010000002 METHYLPREDNISOLONE PER 80 MG: Performed by: STUDENT IN AN ORGANIZED HEALTH CARE EDUCATION/TRAINING PROGRAM

## 2024-04-02 PROCEDURE — 25010000002 BUPIVACAINE (PF) 0.5 % SOLUTION: Performed by: STUDENT IN AN ORGANIZED HEALTH CARE EDUCATION/TRAINING PROGRAM

## 2024-04-02 PROCEDURE — 25510000001 IOPAMIDOL 61 % SOLUTION: Performed by: STUDENT IN AN ORGANIZED HEALTH CARE EDUCATION/TRAINING PROGRAM

## 2024-04-02 PROCEDURE — 77002 NEEDLE LOCALIZATION BY XRAY: CPT

## 2024-04-02 RX ORDER — IOPAMIDOL 612 MG/ML
15 INJECTION, SOLUTION INTRATHECAL
Status: COMPLETED | OUTPATIENT
Start: 2024-04-02 | End: 2024-04-02

## 2024-04-02 RX ORDER — METHYLPREDNISOLONE ACETATE 80 MG/ML
80 INJECTION, SUSPENSION INTRA-ARTICULAR; INTRALESIONAL; INTRAMUSCULAR; SOFT TISSUE ONCE
Status: COMPLETED | OUTPATIENT
Start: 2024-04-02 | End: 2024-04-02

## 2024-04-02 RX ORDER — LIDOCAINE HYDROCHLORIDE 20 MG/ML
20 INJECTION, SOLUTION INFILTRATION; PERINEURAL ONCE
Status: COMPLETED | OUTPATIENT
Start: 2024-04-02 | End: 2024-04-02

## 2024-04-02 RX ORDER — BUPIVACAINE HYDROCHLORIDE 5 MG/ML
10 INJECTION, SOLUTION EPIDURAL; INTRACAUDAL ONCE
Status: COMPLETED | OUTPATIENT
Start: 2024-04-02 | End: 2024-04-02

## 2024-04-02 RX ADMIN — IOPAMIDOL 15 ML: 612 INJECTION, SOLUTION INTRATHECAL at 12:57

## 2024-04-02 RX ADMIN — METHYLPREDNISOLONE ACETATE 80 MG: 80 INJECTION, SUSPENSION INTRA-ARTICULAR; INTRALESIONAL; INTRAMUSCULAR; SOFT TISSUE at 12:58

## 2024-04-02 RX ADMIN — LIDOCAINE HYDROCHLORIDE 20 ML: 20 INJECTION, SOLUTION INFILTRATION; PERINEURAL at 12:55

## 2024-04-02 RX ADMIN — BUPIVACAINE HYDROCHLORIDE 10 ML: 5 INJECTION, SOLUTION EPIDURAL; INTRACAUDAL; PERINEURAL at 12:58

## 2024-04-02 RX ADMIN — SODIUM BICARBONATE 1 MEQ: 84 INJECTION, SOLUTION INTRAVENOUS at 12:55

## 2024-05-24 ENCOUNTER — OFFICE VISIT (OUTPATIENT)
Dept: ORTHOPEDIC SURGERY | Facility: CLINIC | Age: 59
End: 2024-05-24
Payer: COMMERCIAL

## 2024-05-24 VITALS
SYSTOLIC BLOOD PRESSURE: 144 MMHG | OXYGEN SATURATION: 97 % | DIASTOLIC BLOOD PRESSURE: 72 MMHG | BODY MASS INDEX: 29.08 KG/M2 | HEIGHT: 62 IN | HEART RATE: 67 BPM | WEIGHT: 158 LBS

## 2024-05-24 DIAGNOSIS — M17.11 ARTHRITIS OF RIGHT KNEE: ICD-10-CM

## 2024-05-24 DIAGNOSIS — M25.561 RIGHT KNEE PAIN, UNSPECIFIED CHRONICITY: Primary | ICD-10-CM

## 2024-05-24 RX ORDER — TRIAMCINOLONE ACETONIDE 40 MG/ML
40 INJECTION, SUSPENSION INTRA-ARTICULAR; INTRAMUSCULAR
Status: COMPLETED | OUTPATIENT
Start: 2024-05-24 | End: 2024-05-24

## 2024-05-24 RX ORDER — LIDOCAINE HYDROCHLORIDE 10 MG/ML
5 INJECTION, SOLUTION INFILTRATION; PERINEURAL
Status: COMPLETED | OUTPATIENT
Start: 2024-05-24 | End: 2024-05-24

## 2024-05-24 RX ADMIN — LIDOCAINE HYDROCHLORIDE 5 ML: 10 INJECTION, SOLUTION INFILTRATION; PERINEURAL at 10:33

## 2024-05-24 RX ADMIN — TRIAMCINOLONE ACETONIDE 40 MG: 40 INJECTION, SUSPENSION INTRA-ARTICULAR; INTRAMUSCULAR at 10:33

## 2024-05-24 NOTE — PROGRESS NOTES
"Chief Complaint  Follow-up and Pain of the Right Knee    Subjective          Lisa Florence presents to [facility] for   Pain      Lisa returns for follow-up of her right knee.  She had a right knee arthroscopy on 2/20/2023.  She has patellofemoral arthritis and pain.  She reports pain with activity.  She is no longer able to weight lift.  She has difficulty with walking longer distances.    No Known Allergies     Social History     Socioeconomic History    Marital status:    Tobacco Use    Smoking status: Never    Smokeless tobacco: Never   Vaping Use    Vaping status: Never Used   Substance and Sexual Activity    Alcohol use: Not Currently     Alcohol/week: 1.0 standard drink of alcohol     Types: 1 Glasses of wine per week    Drug use: Never    Sexual activity: Defer     Partners: Male     Birth control/protection: Post-menopausal, Hysterectomy        I reviewed the patient's chief complaint, history of present illness, review of systems, past medical history, surgical history, family history, social history, medications, and allergy list.     REVIEW OF SYSTEMS    Constitutional: Denies fevers, chills, weight loss  Cardiovascular: Denies chest pain, shortness of breath  Skin: Denies rashes, acute skin changes  Neurologic: Denies headache, loss of consciousness  MSK: Right knee      Objective   Vital Signs:   /72   Pulse 67   Ht 157.5 cm (62\")   Wt 71.7 kg (158 lb)   SpO2 97%   BMI 28.90 kg/m²     Body mass index is 28.9 kg/m².    Physical Exam    General: Alert. No acute distress.   Right lower extremity: Well-healed arthroscopy portals.  No effusion.  Full extension.  120 degrees of flexion.  Crepitus with motion.  Negative Marisel.  Medial tenderness.  Stable to varus and valgus stress.  No pain with hip motion.  Calf nontender.  Distal neurovascular intact.    Large Joint Arthrocentesis: R knee  Date/Time: 5/24/2024 10:33 AM  Consent given by: patient  Site " marked: site marked  Timeout: Immediately prior to procedure a time out was called to verify the correct patient, procedure, equipment, support staff and site/side marked as required   Supporting Documentation  Indications: pain   Procedure Details  Location: knee - R knee  Needle gauge: 21 G.  Medications administered: 5 mL lidocaine 1 %; 40 mg triamcinolone acetonide 40 MG/ML  Patient tolerance: patient tolerated the procedure well with no immediate complications      This injection documentation was Scribed for Jesse Larson MD by Sejal Holman.  05/24/24   10:34 EDT    Imaging Results (Most Recent)       Procedure Component Value Units Date/Time    XR Knee 4+ View Right [[id number]] Resulted: 05/24/24 1026     Updated: 05/24/24 1027    Narrative:      Indications: Follow-up right knee arthritis    Views: Weightbearing AP, PA flexion, lateral, sunrise right knee    Findings: Medial calcifications along the joint line.  Moderate mild to   moderate patellofemoral degenerative changes.  No fractures noted.    Comparative Data: Comparative data found and reviewed today                     Assessment and Plan        XR Knee 4+ View Right    Result Date: 5/24/2024  Narrative: Indications: Follow-up right knee arthritis Views: Weightbearing AP, PA flexion, lateral, sunrise right knee Findings: Medial calcifications along the joint line.  Moderate mild to moderate patellofemoral degenerative changes.  No fractures noted. Comparative Data: Comparative data found and reviewed today      Diagnoses and all orders for this visit:    1. Right knee pain, unspecified chronicity (Primary)  -     XR Knee 4+ View Right    2. Arthritis of right knee    Other orders  -     Large Joint Arthrocentesis: R knee        We reviewed her imaging which does show arthritic changes.  We discussed treatment options.  We discussed the risk, benefits, indications, alternatives to right knee steroid injection.  She elected to proceed and tolerated  the injection well.  She will follow-up as needed.        Call or return if worsening symptoms.    Scribed for Jesse Larson MD by Sejal Holman  05/24/2024   10:32 EDT         Follow Up       As needed    Patient was given instructions and counseling regarding her condition or for health maintenance advice. Please see specific information pulled into the AVS if appropriate.       I have personally performed the services described in this document as scribed by the above individual and it is both accurate and complete. Jesse Larson MD 05/24/24 15:46 EDT

## 2024-07-12 ENCOUNTER — OFFICE VISIT (OUTPATIENT)
Dept: ORTHOPEDIC SURGERY | Facility: CLINIC | Age: 59
End: 2024-07-12
Payer: COMMERCIAL

## 2024-07-12 ENCOUNTER — PREP FOR SURGERY (OUTPATIENT)
Dept: OTHER | Facility: HOSPITAL | Age: 59
End: 2024-07-12
Payer: COMMERCIAL

## 2024-07-12 VITALS
WEIGHT: 152 LBS | OXYGEN SATURATION: 97 % | HEART RATE: 62 BPM | DIASTOLIC BLOOD PRESSURE: 84 MMHG | BODY MASS INDEX: 27.97 KG/M2 | HEIGHT: 62 IN | SYSTOLIC BLOOD PRESSURE: 128 MMHG

## 2024-07-12 DIAGNOSIS — M16.12 PRIMARY OSTEOARTHRITIS OF LEFT HIP: Primary | ICD-10-CM

## 2024-07-12 DIAGNOSIS — M25.552 LEFT HIP PAIN: Primary | ICD-10-CM

## 2024-07-12 DIAGNOSIS — M16.12 PRIMARY OSTEOARTHRITIS OF LEFT HIP: ICD-10-CM

## 2024-07-12 RX ORDER — TRANEXAMIC ACID 10 MG/ML
1000 INJECTION, SOLUTION INTRAVENOUS ONCE
OUTPATIENT
Start: 2024-07-12 | End: 2024-07-12

## 2024-07-12 NOTE — PROGRESS NOTES
"Chief Complaint  Pain and Follow-up of the Left Hip    Subjective          Lisa Florence presents to [facility] for   History of Present Illness    Shoshana returns today for follow-up of her left hip.  She has a long history of left hip pain and underlying arthritis.  She has been doing therapy exercises, oral anti-inflammatory medications, and has had hip injections.  She reports her last hip injection in April provided no relief.  She has pain predominantly in the groin.  She has pain with active hip motion.  She has difficulty with ambulation secondary to her pain.    No Known Allergies     Social History     Socioeconomic History    Marital status:    Tobacco Use    Smoking status: Never    Smokeless tobacco: Never   Vaping Use    Vaping status: Never Used   Substance and Sexual Activity    Alcohol use: Not Currently     Alcohol/week: 1.0 standard drink of alcohol     Types: 1 Glasses of wine per week    Drug use: Never    Sexual activity: Defer     Partners: Male     Birth control/protection: Post-menopausal, Hysterectomy        I reviewed the patient's chief complaint, history of present illness, review of systems, past medical history, surgical history, family history, social history, medications, and allergy list.     REVIEW OF SYSTEMS    Constitutional: Denies fevers, chills, weight loss  Cardiovascular: Denies chest pain, shortness of breath  Skin: Denies rashes, acute skin changes  Neurologic: Denies headache, loss of consciousness  MSK: Left hip pain      Objective   Vital Signs:   /84   Pulse 62   Ht 157.5 cm (62\")   Wt 68.9 kg (152 lb)   SpO2 97%   BMI 27.80 kg/m²     Body mass index is 27.8 kg/m².    Physical Exam    General: Alert. No acute distress.   Left lower extremity: Leg lengths appear symmetric.  Hip flexion to 90 degrees, internal rotation to 5 degrees with pain, external rotation 10 degrees with pain.  4 out of 5 with hip flexion with pain.  " 5 out of 5 hip abduction.  Full knee extension.  Calf nontender.  Distal neurovascular intact.    Procedures    Imaging Results (Most Recent)       Procedure Component Value Units Date/Time    XR Hip With or Without Pelvis 2 - 3 View Left [[id number]] Resulted: 07/12/24 0817     Updated: 07/12/24 0818    Narrative:      Indications: Follow-up left hip arthritis    Views: AP and frog lateral left hip    Findings: Advanced left hip arthritic change with bone-on-bone loss of   articular height.  No fractures noted.  Hip reduced.    Comparative Data: Comparative data found and reviewed today                     Assessment and Plan        XR Hip With or Without Pelvis 2 - 3 View Left    Result Date: 7/12/2024  Narrative: Indications: Follow-up left hip arthritis Views: AP and frog lateral left hip Findings: Advanced left hip arthritic change with bone-on-bone loss of articular height.  No fractures noted.  Hip reduced. Comparative Data: Comparative data found and reviewed today      Diagnoses and all orders for this visit:    1. Left hip pain (Primary)  -     XR Hip With or Without Pelvis 2 - 3 View Left    2. Primary osteoarthritis of left hip        We reviewed her imaging.  She is no longer getting relief from nonoperative management.  We discussed additional treatment options including both operative and nonoperative care.  We discussed the risk, benefits and indications, alternatives to a left total hip arthroplasty.  We discussed the anterior approach in detail.  We discussed procedure risk including bleeding, infection, damage to nerves and blood vessels, hardware complications, fracture, loosening, instability, leg length discrepancy, persistent pain, functional imitation, anesthesia risk including mortality, DVT/PE, and need for additional procedures.  She elected to proceed with surgical management.        Discussed surgery., Risks/benefits discussed with patient including, but not limited to: infection,  bleeding, neurovascular damage, malunion, nonunion, aesthetic deformity, need for further surgery, and death., Discussed with patient the implant type being used during surgery and patient understands., Surgery pamphlet given., Call or return if worsening symptoms., and DME order for a 3 in 1 given today due to patient will be confined to one room/level of the home that does not offer a toilet during postop recovery.     Scribed for Jesse Larson MD by Karrie Ball MA  07/12/2024   07:59 EDT         Follow Up       2-week postop    Patient was given instructions and counseling regarding her condition or for health maintenance advice. Please see specific information pulled into the AVS if appropriate.     I have personally performed the services described in this document as scribed by the above individual and it is both accurate and complete. Jesse Larson MD 07/12/24 08:25 EDT

## 2024-07-22 ENCOUNTER — TELEPHONE (OUTPATIENT)
Dept: ORTHOPEDIC SURGERY | Facility: CLINIC | Age: 59
End: 2024-07-22

## 2024-07-22 NOTE — TELEPHONE ENCOUNTER
Caller: THANG    Relationship: SELF    Best call back number: [phone]    What is the best time to reach you: ANY    Who are you requesting to speak with (clinical staff, provider,  specific staff member): CLINICAL    Do you know the name of the person who called: PIPER CAMPOS    What was the call regarding: SHE WAS PRICING A RAMP FOR HER HIP REPLACEMENT SX- HOW LONG WILL SHE NEED TO BE OFF HER FEET- SHE WILL BE HOME ALONE STARTING DAY NUMBER 5- RAMP WILL COST [phone]. PLEASE CALL    Is it okay if the provider responds through MyChart: CALL

## 2024-08-28 ENCOUNTER — TELEPHONE (OUTPATIENT)
Dept: ORTHOPEDIC SURGERY | Facility: CLINIC | Age: 59
End: 2024-08-28
Payer: COMMERCIAL

## 2024-08-28 NOTE — TELEPHONE ENCOUNTER
PATIENT NOTIFIED SHE MAY STOP BY THE OFFICE AND  FORM FOR 3 MONTH HANDICAP PARKING PASS. PATIENT ALSP ASKED ABOUT FMLA FORMS ADVISED PATIENT TO DROPT FORMS OFF AT  AND MARIA WOULD CALL HER ONCE THEY WERE COMPLETED. PATIENT VERBALIZED UNDERSTANDING.

## 2024-08-28 NOTE — TELEPHONE ENCOUNTER
Caller: Lisa Koenig    Relationship: Self    Best call back number: [phone] (home)       What is the best time to reach you: ANYTIME     Who are you requesting to speak with (clinical staff, provider,  specific staff member): RAKEL OR PIPER    Do you know the name of the person who called: PATIENT    What was the call regarding: PATIENT WANTS TO KNOW HOW TO GET SET UP WITH HANDICAPPED PARKING.       Is it okay if the provider responds through MyChart: NA

## 2024-09-18 DIAGNOSIS — Z96.642 AFTERCARE FOLLOWING LEFT HIP JOINT REPLACEMENT SURGERY: Primary | ICD-10-CM

## 2024-09-18 DIAGNOSIS — Z47.1 AFTERCARE FOLLOWING LEFT HIP JOINT REPLACEMENT SURGERY: Primary | ICD-10-CM

## 2024-10-08 ENCOUNTER — ANESTHESIA EVENT (OUTPATIENT)
Dept: PERIOP | Facility: HOSPITAL | Age: 59
End: 2024-10-08
Payer: COMMERCIAL

## 2024-10-08 ENCOUNTER — PRE-ADMISSION TESTING (OUTPATIENT)
Dept: PREADMISSION TESTING | Facility: HOSPITAL | Age: 59
End: 2024-10-08
Payer: COMMERCIAL

## 2024-10-08 VITALS
SYSTOLIC BLOOD PRESSURE: 116 MMHG | BODY MASS INDEX: 26.78 KG/M2 | WEIGHT: 145.5 LBS | TEMPERATURE: 97.5 F | OXYGEN SATURATION: 98 % | HEIGHT: 62 IN | DIASTOLIC BLOOD PRESSURE: 84 MMHG | HEART RATE: 70 BPM

## 2024-10-08 DIAGNOSIS — M16.12 PRIMARY OSTEOARTHRITIS OF LEFT HIP: ICD-10-CM

## 2024-10-08 LAB
ALBUMIN SERPL-MCNC: 3.9 G/DL (ref 3.5–5.2)
ALBUMIN/GLOB SERPL: 1.3 G/DL
ALP SERPL-CCNC: 95 U/L (ref 39–117)
ALT SERPL W P-5'-P-CCNC: 34 U/L (ref 1–33)
ANION GAP SERPL CALCULATED.3IONS-SCNC: 10.1 MMOL/L (ref 5–15)
AST SERPL-CCNC: 24 U/L (ref 1–32)
BACTERIA UR QL AUTO: ABNORMAL /HPF
BASOPHILS # BLD AUTO: 0.02 10*3/MM3 (ref 0–0.2)
BASOPHILS NFR BLD AUTO: 0.3 % (ref 0–1.5)
BILIRUB SERPL-MCNC: 0.5 MG/DL (ref 0–1.2)
BILIRUB UR QL STRIP: NEGATIVE
BUN SERPL-MCNC: 18 MG/DL (ref 8–23)
BUN/CREAT SERPL: 22.8 (ref 7–25)
CALCIUM SPEC-SCNC: 9.3 MG/DL (ref 8.6–10.5)
CHLORIDE SERPL-SCNC: 98 MMOL/L (ref 98–107)
CLARITY UR: ABNORMAL
CO2 SERPL-SCNC: 26.9 MMOL/L (ref 22–29)
COLOR UR: YELLOW
CREAT SERPL-MCNC: 0.79 MG/DL (ref 0.57–1)
DEPRECATED RDW RBC AUTO: 41.4 FL (ref 37–54)
EGFRCR SERPLBLD CKD-EPI 2021: 85.2 ML/MIN/1.73
EOSINOPHIL # BLD AUTO: 0.15 10*3/MM3 (ref 0–0.4)
EOSINOPHIL NFR BLD AUTO: 2.4 % (ref 0.3–6.2)
ERYTHROCYTE [DISTWIDTH] IN BLOOD BY AUTOMATED COUNT: 13.2 % (ref 12.3–15.4)
GLOBULIN UR ELPH-MCNC: 2.9 GM/DL
GLUCOSE SERPL-MCNC: 94 MG/DL (ref 65–99)
GLUCOSE UR STRIP-MCNC: NEGATIVE MG/DL
HBA1C MFR BLD: 5.2 % (ref 4.8–5.6)
HCT VFR BLD AUTO: 40.3 % (ref 34–46.6)
HGB BLD-MCNC: 13.6 G/DL (ref 12–15.9)
HGB UR QL STRIP.AUTO: NEGATIVE
HYALINE CASTS UR QL AUTO: ABNORMAL /LPF
IMM GRANULOCYTES # BLD AUTO: 0.01 10*3/MM3 (ref 0–0.05)
IMM GRANULOCYTES NFR BLD AUTO: 0.2 % (ref 0–0.5)
KETONES UR QL STRIP: NEGATIVE
LEUKOCYTE ESTERASE UR QL STRIP.AUTO: ABNORMAL
LYMPHOCYTES # BLD AUTO: 2.16 10*3/MM3 (ref 0.7–3.1)
LYMPHOCYTES NFR BLD AUTO: 34.6 % (ref 19.6–45.3)
MCH RBC QN AUTO: 28.9 PG (ref 26.6–33)
MCHC RBC AUTO-ENTMCNC: 33.7 G/DL (ref 31.5–35.7)
MCV RBC AUTO: 85.6 FL (ref 79–97)
MONOCYTES # BLD AUTO: 0.34 10*3/MM3 (ref 0.1–0.9)
MONOCYTES NFR BLD AUTO: 5.4 % (ref 5–12)
NEUTROPHILS NFR BLD AUTO: 3.56 10*3/MM3 (ref 1.7–7)
NEUTROPHILS NFR BLD AUTO: 57.1 % (ref 42.7–76)
NITRITE UR QL STRIP: NEGATIVE
NRBC BLD AUTO-RTO: 0 /100 WBC (ref 0–0.2)
PH UR STRIP.AUTO: 8 [PH] (ref 5–8)
PLATELET # BLD AUTO: 298 10*3/MM3 (ref 140–450)
PMV BLD AUTO: 9.6 FL (ref 6–12)
POTASSIUM SERPL-SCNC: 3.5 MMOL/L (ref 3.5–5.2)
PROT SERPL-MCNC: 6.8 G/DL (ref 6–8.5)
PROT UR QL STRIP: NEGATIVE
QT INTERVAL: 410 MS
QTC INTERVAL: 412 MS
RBC # BLD AUTO: 4.71 10*6/MM3 (ref 3.77–5.28)
RBC # UR STRIP: ABNORMAL /HPF
REF LAB TEST METHOD: ABNORMAL
SODIUM SERPL-SCNC: 135 MMOL/L (ref 136–145)
SP GR UR STRIP: 1.01 (ref 1–1.03)
SQUAMOUS #/AREA URNS HPF: ABNORMAL /HPF
UROBILINOGEN UR QL STRIP: ABNORMAL
WBC # UR STRIP: ABNORMAL /HPF
WBC NRBC COR # BLD AUTO: 6.24 10*3/MM3 (ref 3.4–10.8)

## 2024-10-08 PROCEDURE — 85025 COMPLETE CBC W/AUTO DIFF WBC: CPT

## 2024-10-08 PROCEDURE — 80053 COMPREHEN METABOLIC PANEL: CPT

## 2024-10-08 PROCEDURE — 81001 URINALYSIS AUTO W/SCOPE: CPT

## 2024-10-08 PROCEDURE — 93005 ELECTROCARDIOGRAM TRACING: CPT

## 2024-10-08 PROCEDURE — 36415 COLL VENOUS BLD VENIPUNCTURE: CPT

## 2024-10-08 PROCEDURE — 83036 HEMOGLOBIN GLYCOSYLATED A1C: CPT

## 2024-10-08 RX ORDER — SACCHAROMYCES BOULARDII 250 MG
CAPSULE ORAL DAILY
COMMUNITY

## 2024-10-08 RX ORDER — CHLORAL HYDRATE 500 MG
CAPSULE ORAL 2 TIMES DAILY WITH MEALS
COMMUNITY

## 2024-10-08 NOTE — DISCHARGE INSTRUCTIONS
IMPORTANT INSTRUCTIONS - PRE-ADMISSION TESTING  DO NOT EAT OR CHEW anything after midnight the night before your procedure.    You may have CLEAR liquids up to __3__ hours prior to ARRIVAL time.   Take the following medications the morning of your procedure with JUST A SIP OF WATER:  Celexa  ___  DO NOT BRING your medications to the hospital with you, UNLESS something has changed since your PRE-Admission Testing appointment.  Hold all vitamins, supplements, and NSAIDS (Non- steroidal anti-inflammatory meds) for one week prior to surgery (you MAY take Tylenol or Acetaminophen).  If you are diabetic, check your blood sugar the morning of your procedure. If it is less than 70 or if you are feeling symptomatic, call the following number for further instructions: [phone]  Use your inhalers/nebulizers as usual, the morning of your procedure. BRING YOUR INHALERS with you.   Bring your CPAP or BIPAP to hospital, ONLY IF YOU WILL BE SPENDING THE NIGHT.   Make sure you have a ride home and have someone who will stay with you the day of your procedure after you go home.  If you have any questions, please call your Pre-Admission Testing Nurse, __BRAD_ at [phone]__.   Per anesthesia request, do not smoke for 24 hours before your procedure or as instructed by your surgeon.  Hold semaglutide 1 week prior to surgery     Clear Liquid Diet        Find out when you need to start a clear liquid diet.   Think of “clear liquids” as anything you could read a newspaper through. This includes things like water, broth, sports drinks, or tea WITHOUT any kind of milk or cream.           Once you are told to start a clear liquid diet, only drink these things until 3 hours before arrival to the hospital or when the hospital says to stop. Total volume limitation: 8 oz.       Clear liquids you CAN drink:   Water   Clear broth: beef, chicken, vegetable, or bone broth with nothing in it   Gatorade   Lemonade or Gilbert-aid   Soda   Tea,  coffee (NO cream or honey)   Jell-O (without fruit)   Popsicles (without fruit or cream)   Italian ices   Juice without pulp: apple, white, grape   You may use salt, pepper, and sugar  No red liquids     Do NOT drink:   Milk or cream   Soy milk, almond milk, coconut milk, or other non-dairy drinks and   creamers   Milkshakes or smoothies   Tomato juice   Orange juice   Grapefruit juice   Cream soups or any other than broth         Clear Liquid Diet:  Do NOT eat any solid food.  Do NOT eat or suck on mints or candy.  Do NOT chew gum.  Do NOT drink thick liquids like milk or juice with pulp in it.  Do NOT add milk, cream, or anything like soy milk or almond milk to coffee or tea.   PREOPERATIVE (BEFORE SURGERY)              BATHING INSTRUCTIONS  Instructions:    You will need to shower 3 separate times utilizing the soap provided; at the times indicated   below:     - 10/15/24 PM   - 10/16 /24 AM   - 10/16/24  PM      Wash your hair and face with normal shampoo and soap, rinse it well before using the surgical soap.      In the shower, wet the skin completely with water from your neck to your feet. Apply the cleanser to your   body ONLY FROM THE NECK TO YOUR FEET.     Do NOT USE THE CLEANSER ON YOUR FACE, HEAD, OR GENITAL (PRIVATE) AREAS.   Keep it out of your eyes, ears, and mouth because of the risk of injury to those areas.      Scrub with a clean washcloth for each bath utilizing the soap provided from the top of your body to the   bottom starting at the neck area.      Pay close attention to your armpits, groin area, and the site of surgery.      Wash your body gently for 5 minutes. Stand outside the stream or turn off the water while scrubbing your   body. Do NOT wash with your regular soap after the surgical cleanser is used.      RINSE THE CLEANSER OFF COMPLETELY with plenty of water. Rinse the area again thoroughly.      Dry off with a clean towel. The surgical soap can cause dryness; however do NOT APPLY  LOTION,   CREAM, POWDER, and/or DEODORANT AFTER SHOWERING.     Be sure to where clean clothes after showering.      Ensure CLEAN BED LINENS AFTER FIRST wash with the surgical soap.      NO PETS ALLOWED IN THE BED with you after utilizing the surgical soap.

## 2024-10-17 ENCOUNTER — APPOINTMENT (OUTPATIENT)
Dept: GENERAL RADIOLOGY | Facility: HOSPITAL | Age: 59
End: 2024-10-17
Payer: COMMERCIAL

## 2024-10-17 ENCOUNTER — ANESTHESIA (OUTPATIENT)
Dept: PERIOP | Facility: HOSPITAL | Age: 59
End: 2024-10-17
Payer: COMMERCIAL

## 2024-10-17 ENCOUNTER — HOSPITAL ENCOUNTER (OUTPATIENT)
Facility: HOSPITAL | Age: 59
Discharge: HOME | End: 2024-10-18
Attending: STUDENT IN AN ORGANIZED HEALTH CARE EDUCATION/TRAINING PROGRAM | Admitting: STUDENT IN AN ORGANIZED HEALTH CARE EDUCATION/TRAINING PROGRAM
Payer: COMMERCIAL

## 2024-10-17 DIAGNOSIS — M16.12 PRIMARY OSTEOARTHRITIS OF LEFT HIP: ICD-10-CM

## 2024-10-17 DIAGNOSIS — M16.12 ARTHRITIS OF LEFT HIP: ICD-10-CM

## 2024-10-17 DIAGNOSIS — R26.2 DIFFICULTY IN WALKING: Primary | ICD-10-CM

## 2024-10-17 DIAGNOSIS — Z78.9 DECREASED ACTIVITIES OF DAILY LIVING (ADL): ICD-10-CM

## 2024-10-17 PROCEDURE — 25010000002 KETOROLAC TROMETHAMINE PER 15 MG: Performed by: STUDENT IN AN ORGANIZED HEALTH CARE EDUCATION/TRAINING PROGRAM

## 2024-10-17 PROCEDURE — 73502 X-RAY EXAM HIP UNI 2-3 VIEWS: CPT

## 2024-10-17 PROCEDURE — 25810000003 LACTATED RINGERS PER 1000 ML: Performed by: ANESTHESIOLOGY

## 2024-10-17 PROCEDURE — 25010000002 LIDOCAINE PF 2% 2 % SOLUTION: Performed by: NURSE ANESTHETIST, CERTIFIED REGISTERED

## 2024-10-17 PROCEDURE — 97535 SELF CARE MNGMENT TRAINING: CPT

## 2024-10-17 PROCEDURE — 25010000002 ROPIVACAINE PER 1 MG: Performed by: STUDENT IN AN ORGANIZED HEALTH CARE EDUCATION/TRAINING PROGRAM

## 2024-10-17 PROCEDURE — 25010000002 PROPOFOL 200 MG/20ML EMULSION: Performed by: NURSE ANESTHETIST, CERTIFIED REGISTERED

## 2024-10-17 PROCEDURE — 97165 OT EVAL LOW COMPLEX 30 MIN: CPT

## 2024-10-17 PROCEDURE — 25010000002 MIDAZOLAM PER 1MG: Performed by: ANESTHESIOLOGY

## 2024-10-17 PROCEDURE — C1776 JOINT DEVICE (IMPLANTABLE): HCPCS | Performed by: STUDENT IN AN ORGANIZED HEALTH CARE EDUCATION/TRAINING PROGRAM

## 2024-10-17 PROCEDURE — 25010000002 SUGAMMADEX 200 MG/2ML SOLUTION: Performed by: NURSE ANESTHETIST, CERTIFIED REGISTERED

## 2024-10-17 PROCEDURE — 99214 OFFICE O/P EST MOD 30 MIN: CPT | Performed by: PHYSICIAN ASSISTANT

## 2024-10-17 PROCEDURE — 25010000002 MORPHINE PER 10 MG: Performed by: STUDENT IN AN ORGANIZED HEALTH CARE EDUCATION/TRAINING PROGRAM

## 2024-10-17 PROCEDURE — 94799 UNLISTED PULMONARY SVC/PX: CPT

## 2024-10-17 PROCEDURE — 25810000003 LACTATED RINGERS PER 1000 ML: Performed by: STUDENT IN AN ORGANIZED HEALTH CARE EDUCATION/TRAINING PROGRAM

## 2024-10-17 PROCEDURE — 25010000002 HYDROMORPHONE 1 MG/ML SOLUTION: Performed by: NURSE ANESTHETIST, CERTIFIED REGISTERED

## 2024-10-17 PROCEDURE — 25010000002 CEFAZOLIN PER 500 MG: Performed by: STUDENT IN AN ORGANIZED HEALTH CARE EDUCATION/TRAINING PROGRAM

## 2024-10-17 PROCEDURE — S0260 H&P FOR SURGERY: HCPCS | Performed by: STUDENT IN AN ORGANIZED HEALTH CARE EDUCATION/TRAINING PROGRAM

## 2024-10-17 PROCEDURE — 27130 TOTAL HIP ARTHROPLASTY: CPT | Performed by: STUDENT IN AN ORGANIZED HEALTH CARE EDUCATION/TRAINING PROGRAM

## 2024-10-17 PROCEDURE — 25010000002 EPINEPHRINE 1 MG/ML SOLUTION: Performed by: STUDENT IN AN ORGANIZED HEALTH CARE EDUCATION/TRAINING PROGRAM

## 2024-10-17 PROCEDURE — 94761 N-INVAS EAR/PLS OXIMETRY MLT: CPT

## 2024-10-17 PROCEDURE — 25010000002 DEXAMETHASONE PER 1 MG: Performed by: NURSE ANESTHETIST, CERTIFIED REGISTERED

## 2024-10-17 PROCEDURE — 97161 PT EVAL LOW COMPLEX 20 MIN: CPT

## 2024-10-17 PROCEDURE — 25010000002 ONDANSETRON PER 1 MG: Performed by: STUDENT IN AN ORGANIZED HEALTH CARE EDUCATION/TRAINING PROGRAM

## 2024-10-17 PROCEDURE — 76000 FLUOROSCOPY <1 HR PHYS/QHP: CPT

## 2024-10-17 PROCEDURE — 25010000002 ONDANSETRON PER 1 MG: Performed by: NURSE ANESTHETIST, CERTIFIED REGISTERED

## 2024-10-17 PROCEDURE — 27130 TOTAL HIP ARTHROPLASTY: CPT | Performed by: PHYSICIAN ASSISTANT

## 2024-10-17 PROCEDURE — 25010000002 FENTANYL CITRATE (PF) 50 MCG/ML SOLUTION: Performed by: NURSE ANESTHETIST, CERTIFIED REGISTERED

## 2024-10-17 PROCEDURE — 97110 THERAPEUTIC EXERCISES: CPT

## 2024-10-17 PROCEDURE — 63710000001 PROMETHAZINE PER 25 MG: Performed by: STUDENT IN AN ORGANIZED HEALTH CARE EDUCATION/TRAINING PROGRAM

## 2024-10-17 PROCEDURE — 25010000002 MEPERIDINE PER 100 MG: Performed by: NURSE ANESTHETIST, CERTIFIED REGISTERED

## 2024-10-17 DEVICE — STEM FEM/HIP AVENIRCOMPLETE COLAR STD/OFFST SZ1: Type: IMPLANTABLE DEVICE | Site: HIP | Status: FUNCTIONAL

## 2024-10-17 DEVICE — TOTAL HIP PRIMARY: Type: IMPLANTABLE DEVICE | Site: HIP | Status: FUNCTIONAL

## 2024-10-17 DEVICE — LINER ACET G7 NTRL E1 SZC 32MM: Type: IMPLANTABLE DEVICE | Site: HIP | Status: FUNCTIONAL

## 2024-10-17 DEVICE — BIOLOX® DELTA, CERAMIC FEMORAL HEAD, M, Ø 32/0, TAPER 12/14
Type: IMPLANTABLE DEVICE | Site: HIP | Status: FUNCTIONAL
Brand: BIOLOX® DELTA

## 2024-10-17 DEVICE — SUT NONABS MAXBRAID NMBR5 K60 38IN WHT/BLU: Type: IMPLANTABLE DEVICE | Site: HIP | Status: FUNCTIONAL

## 2024-10-17 DEVICE — SCRW ACET CORT TRILOGY S/TAP 6.5X25: Type: IMPLANTABLE DEVICE | Site: HIP | Status: FUNCTIONAL

## 2024-10-17 DEVICE — SCRW ACET CORT TRILOGY S/TAP 6.5X20: Type: IMPLANTABLE DEVICE | Site: HIP | Status: FUNCTIONAL

## 2024-10-17 DEVICE — SHLL ACET G7 PPS LTD/HL TI SZC 48MM: Type: IMPLANTABLE DEVICE | Site: HIP | Status: FUNCTIONAL

## 2024-10-17 RX ORDER — TRANEXAMIC ACID 10 MG/ML
1000 INJECTION, SOLUTION INTRAVENOUS ONCE
Status: COMPLETED | OUTPATIENT
Start: 2024-10-17 | End: 2024-10-17

## 2024-10-17 RX ORDER — SCOLOPAMINE TRANSDERMAL SYSTEM 1 MG/1
1 PATCH, EXTENDED RELEASE TRANSDERMAL ONCE
Status: DISCONTINUED | OUTPATIENT
Start: 2024-10-17 | End: 2024-10-17

## 2024-10-17 RX ORDER — SODIUM CHLORIDE 0.9 % (FLUSH) 0.9 %
10 SYRINGE (ML) INJECTION EVERY 12 HOURS SCHEDULED
Status: DISCONTINUED | OUTPATIENT
Start: 2024-10-17 | End: 2024-10-18 | Stop reason: HOSPADM

## 2024-10-17 RX ORDER — FERROUS SULFATE 325(65) MG
325 TABLET ORAL
Status: DISCONTINUED | OUTPATIENT
Start: 2024-10-17 | End: 2024-10-18 | Stop reason: HOSPADM

## 2024-10-17 RX ORDER — POLYETHYLENE GLYCOL 3350 17 G/17G
17 POWDER, FOR SOLUTION ORAL DAILY
Status: DISCONTINUED | OUTPATIENT
Start: 2024-10-17 | End: 2024-10-18 | Stop reason: HOSPADM

## 2024-10-17 RX ORDER — PROMETHAZINE HYDROCHLORIDE 25 MG/1
12.5 TABLET ORAL EVERY 6 HOURS PRN
Status: DISCONTINUED | OUTPATIENT
Start: 2024-10-17 | End: 2024-10-18 | Stop reason: HOSPADM

## 2024-10-17 RX ORDER — FAMOTIDINE 20 MG/1
40 TABLET, FILM COATED ORAL DAILY
Status: DISCONTINUED | OUTPATIENT
Start: 2024-10-17 | End: 2024-10-18 | Stop reason: HOSPADM

## 2024-10-17 RX ORDER — MIDAZOLAM HYDROCHLORIDE 2 MG/2ML
2 INJECTION, SOLUTION INTRAMUSCULAR; INTRAVENOUS ONCE
Status: COMPLETED | OUTPATIENT
Start: 2024-10-17 | End: 2024-10-17

## 2024-10-17 RX ORDER — MEPERIDINE HYDROCHLORIDE 25 MG/ML
12.5 INJECTION INTRAMUSCULAR; INTRAVENOUS; SUBCUTANEOUS
Status: DISCONTINUED | OUTPATIENT
Start: 2024-10-17 | End: 2024-10-17 | Stop reason: HOSPADM

## 2024-10-17 RX ORDER — CITALOPRAM HYDROBROMIDE 20 MG/1
10 TABLET ORAL DAILY
Status: DISCONTINUED | OUTPATIENT
Start: 2024-10-18 | End: 2024-10-18 | Stop reason: HOSPADM

## 2024-10-17 RX ORDER — ATORVASTATIN CALCIUM 10 MG/1
10 TABLET, FILM COATED ORAL NIGHTLY
Status: DISCONTINUED | OUTPATIENT
Start: 2024-10-17 | End: 2024-10-18 | Stop reason: HOSPADM

## 2024-10-17 RX ORDER — ONDANSETRON 4 MG/1
4 TABLET, ORALLY DISINTEGRATING ORAL EVERY 6 HOURS PRN
Status: DISCONTINUED | OUTPATIENT
Start: 2024-10-17 | End: 2024-10-18 | Stop reason: HOSPADM

## 2024-10-17 RX ORDER — OXYCODONE HYDROCHLORIDE 5 MG/1
5 TABLET ORAL EVERY 4 HOURS PRN
Status: DISCONTINUED | OUTPATIENT
Start: 2024-10-17 | End: 2024-10-18 | Stop reason: HOSPADM

## 2024-10-17 RX ORDER — SODIUM CHLORIDE 0.9 % (FLUSH) 0.9 %
10 SYRINGE (ML) INJECTION AS NEEDED
Status: DISCONTINUED | OUTPATIENT
Start: 2024-10-17 | End: 2024-10-18 | Stop reason: HOSPADM

## 2024-10-17 RX ORDER — SODIUM CHLORIDE, SODIUM LACTATE, POTASSIUM CHLORIDE, CALCIUM CHLORIDE 600; 310; 30; 20 MG/100ML; MG/100ML; MG/100ML; MG/100ML
9 INJECTION, SOLUTION INTRAVENOUS CONTINUOUS PRN
Status: ACTIVE | OUTPATIENT
Start: 2024-10-17 | End: 2024-10-18

## 2024-10-17 RX ORDER — LIDOCAINE HYDROCHLORIDE 20 MG/ML
INJECTION, SOLUTION EPIDURAL; INFILTRATION; INTRACAUDAL; PERINEURAL AS NEEDED
Status: DISCONTINUED | OUTPATIENT
Start: 2024-10-17 | End: 2024-10-17 | Stop reason: SURG

## 2024-10-17 RX ORDER — ONDANSETRON 2 MG/ML
INJECTION INTRAMUSCULAR; INTRAVENOUS AS NEEDED
Status: DISCONTINUED | OUTPATIENT
Start: 2024-10-17 | End: 2024-10-17 | Stop reason: SURG

## 2024-10-17 RX ORDER — ONDANSETRON 2 MG/ML
4 INJECTION INTRAMUSCULAR; INTRAVENOUS EVERY 6 HOURS PRN
Status: DISCONTINUED | OUTPATIENT
Start: 2024-10-17 | End: 2024-10-18 | Stop reason: HOSPADM

## 2024-10-17 RX ORDER — PROMETHAZINE HYDROCHLORIDE 12.5 MG/1
12.5 SUPPOSITORY RECTAL EVERY 6 HOURS PRN
Status: DISCONTINUED | OUTPATIENT
Start: 2024-10-17 | End: 2024-10-18 | Stop reason: HOSPADM

## 2024-10-17 RX ORDER — OXYCODONE HYDROCHLORIDE 5 MG/1
10 TABLET ORAL EVERY 4 HOURS PRN
Status: DISCONTINUED | OUTPATIENT
Start: 2024-10-17 | End: 2024-10-18 | Stop reason: HOSPADM

## 2024-10-17 RX ORDER — ROCURONIUM BROMIDE 10 MG/ML
INJECTION, SOLUTION INTRAVENOUS AS NEEDED
Status: DISCONTINUED | OUTPATIENT
Start: 2024-10-17 | End: 2024-10-17 | Stop reason: SURG

## 2024-10-17 RX ORDER — TRAMADOL HYDROCHLORIDE 50 MG/1
50 TABLET ORAL EVERY 4 HOURS PRN
Status: DISCONTINUED | OUTPATIENT
Start: 2024-10-17 | End: 2024-10-18 | Stop reason: HOSPADM

## 2024-10-17 RX ORDER — ACETAMINOPHEN 500 MG
1000 TABLET ORAL EVERY 8 HOURS
Status: DISCONTINUED | OUTPATIENT
Start: 2024-10-17 | End: 2024-10-18 | Stop reason: HOSPADM

## 2024-10-17 RX ORDER — SODIUM CHLORIDE, SODIUM LACTATE, POTASSIUM CHLORIDE, CALCIUM CHLORIDE 600; 310; 30; 20 MG/100ML; MG/100ML; MG/100ML; MG/100ML
50 INJECTION, SOLUTION INTRAVENOUS CONTINUOUS
Status: ACTIVE | OUTPATIENT
Start: 2024-10-17 | End: 2024-10-17

## 2024-10-17 RX ORDER — DEXAMETHASONE SODIUM PHOSPHATE 4 MG/ML
INJECTION, SOLUTION INTRA-ARTICULAR; INTRALESIONAL; INTRAMUSCULAR; INTRAVENOUS; SOFT TISSUE AS NEEDED
Status: DISCONTINUED | OUTPATIENT
Start: 2024-10-17 | End: 2024-10-17 | Stop reason: SURG

## 2024-10-17 RX ORDER — AMOXICILLIN 250 MG
2 CAPSULE ORAL 2 TIMES DAILY
Status: DISCONTINUED | OUTPATIENT
Start: 2024-10-17 | End: 2024-10-18 | Stop reason: HOSPADM

## 2024-10-17 RX ORDER — ACETAMINOPHEN 500 MG
1000 TABLET ORAL ONCE
Status: COMPLETED | OUTPATIENT
Start: 2024-10-17 | End: 2024-10-17

## 2024-10-17 RX ORDER — ASPIRIN 325 MG
325 TABLET, DELAYED RELEASE (ENTERIC COATED) ORAL EVERY 12 HOURS SCHEDULED
Status: DISCONTINUED | OUTPATIENT
Start: 2024-10-18 | End: 2024-10-18 | Stop reason: HOSPADM

## 2024-10-17 RX ORDER — SODIUM CHLORIDE 9 MG/ML
40 INJECTION, SOLUTION INTRAVENOUS AS NEEDED
Status: ACTIVE | OUTPATIENT
Start: 2024-10-17 | End: 2024-10-17

## 2024-10-17 RX ORDER — PROMETHAZINE HYDROCHLORIDE 25 MG/1
25 SUPPOSITORY RECTAL ONCE AS NEEDED
Status: DISCONTINUED | OUTPATIENT
Start: 2024-10-17 | End: 2024-10-17 | Stop reason: HOSPADM

## 2024-10-17 RX ORDER — MAGNESIUM HYDROXIDE 1200 MG/15ML
LIQUID ORAL AS NEEDED
Status: DISCONTINUED | OUTPATIENT
Start: 2024-10-17 | End: 2024-10-17 | Stop reason: HOSPADM

## 2024-10-17 RX ORDER — PROPOFOL 10 MG/ML
INJECTION, EMULSION INTRAVENOUS AS NEEDED
Status: DISCONTINUED | OUTPATIENT
Start: 2024-10-17 | End: 2024-10-17 | Stop reason: SURG

## 2024-10-17 RX ORDER — ONDANSETRON 2 MG/ML
4 INJECTION INTRAMUSCULAR; INTRAVENOUS ONCE AS NEEDED
Status: DISCONTINUED | OUTPATIENT
Start: 2024-10-17 | End: 2024-10-17 | Stop reason: HOSPADM

## 2024-10-17 RX ORDER — TRAMADOL HYDROCHLORIDE 50 MG/1
100 TABLET ORAL EVERY 4 HOURS PRN
Status: DISCONTINUED | OUTPATIENT
Start: 2024-10-17 | End: 2024-10-18 | Stop reason: HOSPADM

## 2024-10-17 RX ORDER — SACCHAROMYCES BOULARDII 250 MG
250 CAPSULE ORAL 2 TIMES DAILY
Status: DISCONTINUED | OUTPATIENT
Start: 2024-10-17 | End: 2024-10-18 | Stop reason: HOSPADM

## 2024-10-17 RX ORDER — PROMETHAZINE HYDROCHLORIDE 12.5 MG/1
25 TABLET ORAL ONCE AS NEEDED
Status: DISCONTINUED | OUTPATIENT
Start: 2024-10-17 | End: 2024-10-17 | Stop reason: HOSPADM

## 2024-10-17 RX ORDER — NALOXONE HCL 0.4 MG/ML
0.4 VIAL (ML) INJECTION
Status: DISCONTINUED | OUTPATIENT
Start: 2024-10-17 | End: 2024-10-18 | Stop reason: HOSPADM

## 2024-10-17 RX ORDER — ACETAMINOPHEN 325 MG/1
325 TABLET ORAL EVERY 4 HOURS PRN
Status: DISCONTINUED | OUTPATIENT
Start: 2024-10-17 | End: 2024-10-18 | Stop reason: HOSPADM

## 2024-10-17 RX ORDER — FENTANYL CITRATE 50 UG/ML
INJECTION, SOLUTION INTRAMUSCULAR; INTRAVENOUS AS NEEDED
Status: DISCONTINUED | OUTPATIENT
Start: 2024-10-17 | End: 2024-10-17 | Stop reason: SURG

## 2024-10-17 RX ORDER — KETOROLAC TROMETHAMINE 15 MG/ML
15 INJECTION, SOLUTION INTRAMUSCULAR; INTRAVENOUS EVERY 6 HOURS
Status: COMPLETED | OUTPATIENT
Start: 2024-10-17 | End: 2024-10-18

## 2024-10-17 RX ORDER — BISACODYL 10 MG
10 SUPPOSITORY, RECTAL RECTAL DAILY PRN
Status: DISCONTINUED | OUTPATIENT
Start: 2024-10-17 | End: 2024-10-18 | Stop reason: HOSPADM

## 2024-10-17 RX ORDER — PHENYLEPHRINE HCL IN 0.9% NACL 1 MG/10 ML
SYRINGE (ML) INTRAVENOUS AS NEEDED
Status: DISCONTINUED | OUTPATIENT
Start: 2024-10-17 | End: 2024-10-17 | Stop reason: SURG

## 2024-10-17 RX ORDER — OXYCODONE HYDROCHLORIDE 5 MG/1
5 TABLET ORAL
Status: DISCONTINUED | OUTPATIENT
Start: 2024-10-17 | End: 2024-10-17 | Stop reason: HOSPADM

## 2024-10-17 RX ADMIN — HYDROMORPHONE HYDROCHLORIDE 0.5 MG: 1 INJECTION, SOLUTION INTRAMUSCULAR; INTRAVENOUS; SUBCUTANEOUS at 09:58

## 2024-10-17 RX ADMIN — HYDROMORPHONE HYDROCHLORIDE 0.5 MG: 1 INJECTION, SOLUTION INTRAMUSCULAR; INTRAVENOUS; SUBCUTANEOUS at 09:39

## 2024-10-17 RX ADMIN — HYDROMORPHONE HYDROCHLORIDE 0.5 MG: 1 INJECTION, SOLUTION INTRAMUSCULAR; INTRAVENOUS; SUBCUTANEOUS at 08:29

## 2024-10-17 RX ADMIN — DEXAMETHASONE SODIUM PHOSPHATE 4 MG: 4 INJECTION, SOLUTION INTRAMUSCULAR; INTRAVENOUS at 09:07

## 2024-10-17 RX ADMIN — KETOROLAC TROMETHAMINE 15 MG: 15 INJECTION, SOLUTION INTRAMUSCULAR; INTRAVENOUS at 18:32

## 2024-10-17 RX ADMIN — ACETAMINOPHEN 1000 MG: 500 TABLET ORAL at 15:36

## 2024-10-17 RX ADMIN — Medication 50 MCG: at 07:40

## 2024-10-17 RX ADMIN — ONDANSETRON 4 MG: 2 INJECTION INTRAMUSCULAR; INTRAVENOUS at 21:18

## 2024-10-17 RX ADMIN — ACETAMINOPHEN 1000 MG: 500 TABLET ORAL at 06:55

## 2024-10-17 RX ADMIN — ROCURONIUM BROMIDE 50 MG: 10 INJECTION, SOLUTION INTRAVENOUS at 07:21

## 2024-10-17 RX ADMIN — MIDAZOLAM HYDROCHLORIDE 2 MG: 1 INJECTION, SOLUTION INTRAMUSCULAR; INTRAVENOUS at 06:58

## 2024-10-17 RX ADMIN — OXYCODONE HYDROCHLORIDE 5 MG: 5 TABLET ORAL at 21:18

## 2024-10-17 RX ADMIN — FENTANYL CITRATE 50 MCG: 50 INJECTION, SOLUTION INTRAMUSCULAR; INTRAVENOUS at 08:00

## 2024-10-17 RX ADMIN — SCOPALAMINE 1 PATCH: 1 PATCH, EXTENDED RELEASE TRANSDERMAL at 06:58

## 2024-10-17 RX ADMIN — SODIUM CHLORIDE, POTASSIUM CHLORIDE, SODIUM LACTATE AND CALCIUM CHLORIDE 9 ML/HR: 600; 310; 30; 20 INJECTION, SOLUTION INTRAVENOUS at 23:34

## 2024-10-17 RX ADMIN — SODIUM CHLORIDE, POTASSIUM CHLORIDE, SODIUM LACTATE AND CALCIUM CHLORIDE 9 ML/HR: 600; 310; 30; 20 INJECTION, SOLUTION INTRAVENOUS at 06:58

## 2024-10-17 RX ADMIN — TRANEXAMIC ACID 1000 MG: 10 INJECTION, SOLUTION INTRAVENOUS at 09:04

## 2024-10-17 RX ADMIN — SODIUM CHLORIDE 2000 MG: 9 INJECTION, SOLUTION INTRAVENOUS at 15:36

## 2024-10-17 RX ADMIN — Medication 50 MCG: at 08:48

## 2024-10-17 RX ADMIN — Medication 50 MCG: at 07:35

## 2024-10-17 RX ADMIN — ACETAMINOPHEN 1000 MG: 500 TABLET ORAL at 23:34

## 2024-10-17 RX ADMIN — KETOROLAC TROMETHAMINE 15 MG: 15 INJECTION, SOLUTION INTRAMUSCULAR; INTRAVENOUS at 11:44

## 2024-10-17 RX ADMIN — Medication 10 ML: at 11:44

## 2024-10-17 RX ADMIN — Medication 10 ML: at 21:19

## 2024-10-17 RX ADMIN — FENTANYL CITRATE 50 MCG: 50 INJECTION, SOLUTION INTRAMUSCULAR; INTRAVENOUS at 07:21

## 2024-10-17 RX ADMIN — FAMOTIDINE 40 MG: 20 TABLET ORAL at 11:44

## 2024-10-17 RX ADMIN — OXYCODONE 5 MG: 5 TABLET ORAL at 09:45

## 2024-10-17 RX ADMIN — SODIUM CHLORIDE 2 G: 9 INJECTION, SOLUTION INTRAVENOUS at 07:20

## 2024-10-17 RX ADMIN — MEPERIDINE HYDROCHLORIDE 12.5 MG: 25 INJECTION INTRAMUSCULAR; INTRAVENOUS; SUBCUTANEOUS at 10:14

## 2024-10-17 RX ADMIN — SENNOSIDES AND DOCUSATE SODIUM 2 TABLET: 50; 8.6 TABLET ORAL at 21:18

## 2024-10-17 RX ADMIN — SENNOSIDES AND DOCUSATE SODIUM 2 TABLET: 50; 8.6 TABLET ORAL at 11:44

## 2024-10-17 RX ADMIN — SODIUM CHLORIDE, POTASSIUM CHLORIDE, SODIUM LACTATE AND CALCIUM CHLORIDE 50 ML/HR: 600; 310; 30; 20 INJECTION, SOLUTION INTRAVENOUS at 11:45

## 2024-10-17 RX ADMIN — Medication 100 MCG: at 07:25

## 2024-10-17 RX ADMIN — FERROUS SULFATE TAB 325 MG (65 MG ELEMENTAL FE) 325 MG: 325 (65 FE) TAB at 11:44

## 2024-10-17 RX ADMIN — PROMETHAZINE HYDROCHLORIDE 12.5 MG: 25 TABLET ORAL at 13:24

## 2024-10-17 RX ADMIN — ONDANSETRON HYDROCHLORIDE 4 MG: 2 SOLUTION INTRAMUSCULAR; INTRAVENOUS at 09:07

## 2024-10-17 RX ADMIN — HYDROMORPHONE HYDROCHLORIDE 0.5 MG: 1 INJECTION, SOLUTION INTRAMUSCULAR; INTRAVENOUS; SUBCUTANEOUS at 08:22

## 2024-10-17 RX ADMIN — TRANEXAMIC ACID 1000 MG: 10 INJECTION, SOLUTION INTRAVENOUS at 07:02

## 2024-10-17 RX ADMIN — LIDOCAINE HYDROCHLORIDE 40 MG: 20 INJECTION, SOLUTION INTRAVENOUS at 07:21

## 2024-10-17 RX ADMIN — SODIUM CHLORIDE 2000 MG: 9 INJECTION, SOLUTION INTRAVENOUS at 23:34

## 2024-10-17 RX ADMIN — SUGAMMADEX 100 MG: 100 INJECTION, SOLUTION INTRAVENOUS at 09:07

## 2024-10-17 RX ADMIN — PROPOFOL 150 MG: 10 INJECTION, EMULSION INTRAVENOUS at 07:21

## 2024-10-17 RX ADMIN — ONDANSETRON 4 MG: 2 INJECTION INTRAMUSCULAR; INTRAVENOUS at 09:42
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
## 2024-10-17 NOTE — SIGNIFICANT NOTE
10/17/24 1339   Plan   Final Discharge Disposition Code 01 - home or self-care   Final Note TICO Manuel. Aappt: 10/21/24 at 9AM.

## 2024-10-17 NOTE — ANESTHESIA POSTPROCEDURE EVALUATION
Patient: Lisa Florence    Procedure Summary       Date: 10/17/24 Room / Location: [facility] OR 01 / [facility] MAIN OR    Anesthesia Start: 0717 Anesthesia Stop: 0926    Procedure: TOTAL HIP ARTHROPLASTY ANTERIOR (Left: Hip) Diagnosis:       Primary osteoarthritis of left hip      (Primary osteoarthritis of left hip [M16.12])    Surgeons: Jsese Larson MD Provider: Michael Gonzalez MD    Anesthesia Type: general ASA Status: 2            Anesthesia Type: general    Vitals  Vitals Value Taken Time   /61 10/17/24 0947   Temp 35.7 °C (96.2 °F) 10/17/24 0925   Pulse 69 10/17/24 0950   Resp 15 10/17/24 0925   SpO2 97 % 10/17/24 0950   Vitals shown include unfiled device data.        Post Anesthesia Care and Evaluation    Patient location during evaluation: bedside  Patient participation: complete - patient participated  Level of consciousness: awake    Airway patency: patent  PONV Status: none  Cardiovascular status: acceptable  Respiratory status: acceptable  Hydration status: acceptable

## 2024-10-17 NOTE — ANESTHESIA PREPROCEDURE EVALUATION
Anesthesia Evaluation     history of anesthetic complications:  PONV prolonged sedation               Airway   Mallampati: III  TM distance: >3 FB  Neck ROM: full  No difficulty expected  Dental - normal exam     Pulmonary - normal exam    breath sounds clear to auscultation  Cardiovascular - normal exam    Rhythm: regular  Rate: normal    (+) hypertension (triamterene/hctz), hyperlipidemia      Neuro/Psych  (+) psychiatric history Anxiety and Depression  GI/Hepatic/Renal/Endo    (+) obesity (semaglutide - last dose 10/8/24)    Musculoskeletal     Abdominal  - normal exam   Substance History      OB/GYN          Other   arthritis,                 Anesthesia Plan    ASA 2     general     Reason for not using neuraxial anesthesia or peripheral nerve block: Patient Preference  (Patient understands anesthesia not responsible for dental damage.)  intravenous induction     Anesthetic plan, risks, benefits, and alternatives have been provided, discussed and informed consent has been obtained with: patient and spouse/significant other.    Use of blood products discussed with patient .    Plan discussed with CRNA.    CODE STATUS:
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
## 2024-10-17 NOTE — PLAN OF CARE
Goal Outcome Evaluation:  Plan of Care Reviewed With: patient           Outcome Evaluation: Pt is A&O X 4, on 2L NC, and X 1 assist. All scheduled medications given as ordered. Pt rated pain 1/10 but relieved with positioning and ice pack. Safety checks maintained throughout shift with pt resting in chair, wheels to chair locked, and personal items and call light within reach. VSS.

## 2024-10-17 NOTE — PLAN OF CARE
Goal Outcome Evaluation:  Plan of Care Reviewed With: patient, spouse        Progress: no change  Outcome Evaluation: Patient presents with limitations in .  would benefit from continued skilled occupational therapy services to maximize independence with ADLs/functional transfers.    Anticipated Discharge Disposition (OT): home with outpatient therapy services, home with assist

## 2024-10-17 NOTE — CONSULTS
[facility]   HOSPITALIST HISTORY AND PHYSICAL  Date: 10/17/2024   Patient Name: Lisa Florence  : 1965  MRN: [id number]  Primary Care Physician:  Jenny Lanier APRN  Date of admission: 10/17/2024    Subjective   Subjective   Chief Complaint: Medical management    HPI:    Lisa Florence is a 59 y.o. female who is being seen by hospitalist for general medical management of chronic medical issues including HTN, anxiety/depression, dyslipidemia, IBS, and psoriasis. She also has degenerative joint/osteoarthritis left hip and had left hip replaced 10/17/24 by Dr. Larson.  Recent lab work reviewed and includes creatinine 0.79, hemoglobin 13.6, A1c 5.2.  Current vital signs include BP range 88//59.  99% sats on 2 L.  P53 bpm.  T97.8.    Awake alert and conversational.   at bedside.  Some nausea earlier.  Blood pressure soft, mild hypotension and weakness, improved.  She took triamterene/HCTZ yesterday.  Currently on IV fluids.  Left hip pain reasonably controlled.  No shortness of breath, cough or wheeze.  No chest pains.    Pertinent History   Past Medical History:    Active Ambulatory Problems     Diagnosis Date Noted    Allergic rhinitis due to allergen 2021    Anemia 2021    Anxiety 2021    B12 deficiency 2021    Depression 2021    Essential hypertension 2021    Hyperlipemia 2021    IBS (irritable bowel syndrome) 2021    Sinus trouble 2021    Class 1 obesity due to excess calories with serious comorbidity and body mass index (BMI) of 31.0 to 31.9 in adult 2021    Vitamin D deficiency 2021    Psoriasis 2021    Acute medial meniscus tear of right knee 02/10/2023    Arthritis of left hip 2024    Primary osteoarthritis of left hip 2024     Resolved Ambulatory Problems     Diagnosis Date Noted    No Resolved Ambulatory Problems     Past Medical History:   Diagnosis Date    Allergies      Anesthesia complication     Hemorrhoid     HLD (hyperlipidemia) 2021    Low back strain Several yrs    Lumbosacral disc disease     Meniscal injury     Rotator cuff syndrome     Skin disease     Tendinitis of knee        Past Surgical History:    Past Surgical History:   Procedure Laterality Date     SECTION      X1    HYSTERECTOMY      KNEE ARTHROSCOPY W/ MENISCECTOMY Right 2023    Procedure: KNEE ARTHROSCOPY WITH PARTIAL MEDIAL MENISCECTOMY, CHONDROPLASTY;  Surgeon: Jesse Larson MD;  Location: [facility] [place] [facility] [place];  Service: Orthopedics;  Laterality: Right;    LAPAROSCOPIC CHOLECYSTECTOMY      SHOULDER SURGERY Left     RECONSTRUCTION       Social History:   Lives locally with   Non-smoker  Employed [employer]  Social History     Socioeconomic History    Marital status:    Tobacco Use    Smoking status: Never    Smokeless tobacco: Never   Vaping Use    Vaping status: Never Used   Substance and Sexual Activity    Alcohol use: Not Currently     Alcohol/week: 1.0 standard drink of alcohol     Types: 1 Glasses of wine per week    Drug use: Never    Sexual activity: Defer     Partners: Male     Birth control/protection: Post-menopausal, Hysterectomy       Family History:     Family History   Problem Relation Age of Onset    Malig Hyperthermia Neg Hx        Home Medications (reported)  Current Outpatient Medications   Medication Instructions    atorvastatin (LIPITOR) 10 mg, Nightly    citalopram (CeleXA) 10 MG tablet Take 1 tablet by mouth once daily    multivitamin (THERAGRAN) tablet tablet Take  by mouth.    Omega-3 Fatty Acids (fish oil) 1000 MG capsule capsule 2 Times Daily With Meals    saccharomyces boulardii (FLORASTOR) 250 MG capsule Daily    Semaglutide-Weight Management 1 MG/0.5ML solution auto-injector Subcutaneous    triamterene-hydrochlorothiazide (MAXZIDE-25) 37.5-25 MG per tablet 1 tablet, Daily       Allergies:  No Known Allergies    REVIEW OF  SYSTEMS:   Left hip pain    Objective   Objective   Vitals:   Temp:  [96.2 °F (35.7 °C)-98 °F (36.7 °C)] 97.8 °F (36.6 °C)  Heart Rate:  [53-71] 53  Resp:  [15-18] 16  BP: ()/(54-86) 101/59  Flow (L/min) (Oxygen Therapy):  [2] 2  PHYSICAL EXAM   CON: WN. WD. NAD.   NECK:  No thyromegaly. No stridor. Trachea midline.  RESP:  CTA. No wheezes. No crackles.    CV:  Rhythm regular. Rate 55 bpm. No murmur noted.  No edema.  GI:  Soft and nontender. Nondistended.  EXT: Left hip dressing intact.  LLE intact neurovascularly  PSYCH:  Alert. Oriented. Normal affect and mood.  NEURO:  No dysarthria or aphasia.  SKIN: No chronic venous stasis changes or varicosities.  No cellulitis    Result Review    Result Review:  I have personally reviewed the results from the time of this admission to 10/17/2024 15:19 EDT and agree with these findings:  []  Laboratory  []  Microbiology  []  Radiology  []  EKG/Telemetry   []  Cardiology/Vascular   []  Pathology  []  Old records  []  Other:    Assessment & Plan   Assessment / Plan   Assessment:    Medical management  HTN with recent hypotension  Recent nausea  Dyslipidemia  Anxiety/depression  IBS  Degenerative joint/OA left hip  Left hip replacement 10/17/24 by Dr. Larson     Plan:    Gentle IV fluid hydration.  Monitor blood pressure trend.  Hold triamterene/HCTZ.  Monitor for any orthostasis.  Check basic metabolic panel in the morning  Continue Celexa  Continue statin  Recommend daily physical therapy  DVT prophylaxis per orthopedist  Pain med Rx per orthopedist    VTE Prophylaxis:  Mechanical VTE prophylaxis orders are present.      CODE STATUS:         Electronically signed by MADHU Winter, 10/17/24, 3:19 PM EDT.

## 2024-10-17 NOTE — OP NOTE
TOTAL HIP ARTHROPLASTY ANTERIOR  Procedure Report    Patient Name:  Lisa Florence  YOB: 1965    Date of Surgery:  10/17/2024     Indications: Lisa is a 59-year-old female with advanced left hip osteoarthritis.  We discussed treatment options.  We discussed additional nonoperative management.  We discussed surgical management with left total hip arthroplasty.  We discussed surgical benefits including pain relief and improved function.  We discussed surgical risks including bleeding, infection, damage to nerves or blood vessels, hardware complications, fracture, dislocation, loosening, leg length discrepancy, persistent pain, anesthesia risk including mortality, DVT, and need for additional procedures.  The patient elected to proceed and consent was obtained.    Pre-op Diagnosis:   Primary osteoarthritis of left hip [M16.12]       Post-Op Diagnosis Codes:     * Primary osteoarthritis of left hip [M16.12]    Procedure/CPT® Codes:      Procedure(s):  TOTAL HIP ARTHROPLASTY ANTERIOR    Surgical Approach: Hip Direct Anterior ([place])        Staff:  Surgeon(s):  Jesse Larson MD    Assistant: Symone Xie PA-C    Anesthesia: General    Estimated Blood Loss:  150 mL    Implants:    Implant Name Type Inv. Item Serial No.  Lot No. LRB No. Used Action   SUT NONABS MAXBRAID NMBR5 K60 38IN WHT/JOHN - [id number] Implant SUT NONABS MAXBRAID NMBR5 K60 38IN WHT/JOHN  [employer] [id number] Left 2 Implanted   SHLL ACET G7 PPS LTD/HL TI SZC 48MM - [id number] Implant SHLL ACET G7 PPS LTD/HL TI SZC 48MM  [employer] [id number] Left 1 Implanted   LINER ACET G7 NTRL E1 SZC 32MM - [id number] Implant LINER ACET G7 NTRL E1 SZC 32MM  [employer] [id number] Left 1 Implanted   SCRW ACET JOANN TRILOGY S/TAP 6.5X20 - [id number] Implant SCRW ACET JOANN TRILOGY S/TAP 6.5X20  [employer] [id number] Left 1 Implanted   SCRW ACET JOANN TRILOGY S/TAP 6.5X25 - [id number] Implant SCRW ACET JOANN TRILOGY S/TAP  6.5X25  [employer] [id number] Left 1 Implanted   HD FEM/HIP BIOLOX/DELTA CERAM 12/28O53RV PLS0MM - [id number] Implant HD FEM/HIP BIOLOX/DELTA CERAM 12/73U29JD PLS0MM  [employer] [id number] Left 1 Implanted   STEM FEM/HIP AVENIRCOMPLETE COLAR STD/OFFST SZ1 - [id number] Implant STEM FEM/HIP AVENIRCOMPLETE COLAR STD/OFFST SZ1  [employer] [id number] Left 1 Implanted       Specimen:          None        Findings: Left hip osteoarthritis    Complications: None    Description of Procedure: The patient was met in the preoperative holding area and the operative extremity was marked.  The patient was transported to the operating room and general anesthesia was induced without complication.  The patient was then carefully transferred onto the [place] table with both legs well-padded and placed into the traction boots.  2 g of preoperative Ancef was administered.  1 g of preoperative tranexamic acid was administered.  The left hip was then prepped and draped in the usual sterile fashion.  A timeout was taken to ensure the appropriate patient, procedure, and procedural site.  All were in agreement.  I made a 10 cm longitudinal incision beginning 2 cm distal and lateral to the ASIS and extending toward the fibular head.  Sharp dissection was carried down through the skin and subcutaneous tissues.  Hemostasis was obtained with Bovie electrocautery.  The fascia over the TFL was identified.  This was split sharply in line with the incision.  The fascia was elevated.  The TFL was mobilized.  A Cobra retractor was placed over the superior aspect of the femoral neck.  A Luciano elevator was used to develop the interval between the rectus and the anterior aspect of the femur.  A Cobra retractor was then placed over the inferior aspect of the femoral neck.  At the distal aspect of the interval I identified the circumflex femoral vessels.  These were cauterized.  I then performed a T-shaped capsulotomy.  The anterior and posterior limbs of the  capsule were tagged with nonabsorbable sutures.  The Cobra retractors were placed intracapsular.  I then planned and performed a femoral neck cut using C arm assistance.  This was done without complication.  The cut was completed laterally with an osteotome.  Traction was applied to the leg through the [place] table.  I utilized a corkscrew on power to remove the femoral head without complication. Acetabular retractors were positioned over the anterior and posterior wall without complication.  I sharply excised the labrum from the periphery of the acetabulum.  The fatty tissue in the fossa was removed with a rongeur. I began reaming under direct visualization.  The head was sized at a 48.  I started with a 43 reamer.  I medialized down to the medial wall.  I then reamed under C-arm guidance sequentially up to a size 47 reamer.  This had adequate purchase.  I was satisfied with the abduction and anteversion radiographically.  Adequate bleeding bone was present under direct visualization and the anterior and posterior walls were intact.  I irrigated the acetabulum.  I then impacted a Marisa G7 48 mm cup, matching my previous reamer positioning.  This was done without complication.  The cup had adequate purchase.  The cup was irrigated and was fully seated by direct visualization.  I then used C-arm guidance to drill, measure, and place 2 acetabular screws in standard fashion.  The cup was again irrigated.  The neutral acetabular liner was then impacted without complication and was fully seated.  Acetabular retractors were then removed.  The femoral hook for the [place] table attachment was then inserted.  All traction was removed from the leg.  The leg was dropped into extension, external rotation, and adduction.  I then performed a capsular release and released soft tissue over the greater trochanter including the piriformis.  I had adequate visualization of the proximal femur.  I used a rongeur to remove the femoral neck  remnant and lateralize proximally.  I then identified the femoral canal utilizing the curved rasp.  I used this to lateralize as well.  I then began broaching.  I started with the starting broach and sequentially broached up to a size 1 for the Avenir Complete system which had excellent purchase.  I calcar planed the neck at this level.  I inserted a +0 neck trial with a 32 mm head.  Retractors removed and the hip was reduced.  This was stable in extension and external rotation at 90 degrees.  C-arm fluoroscopy was brought in.  Femoral component sizing was appropriate and no periprosthetic fractures were noted.  I was satisfied with the leg lengths radiographically.  I was satisfied with these trial components.  The hip was dislocated without complication.  The leg was repositioned and retractors were inserted.  Trial components were removed.  I irrigated the proximal femur with normal saline.  I then impacted the size 1 Avenir Complete stem without complication, matching my previous version.  The trunnion and acetabulum were thoroughly irrigated.  The acetabulum was clear.  I impacted the size 32 mm head with a +0 neck without complication.  This was reduced.  The hip was again stable in extension and external rotation at 90 degrees.  Final images were obtained with C arm.  I was satisfied with component positioning.  No complications were noted.  The hip was thoroughly irrigated with Irrisept followed by normal saline.  Adequate hemostasis was obtained.  The capsule was closed with the nonabsorbable tag sutures.  Fascia over the TFL was closed with 0 Vicryl in running fashion.  An additional 1 g of tranexamic acid was administered.  Periarticular local anesthetic was injected.  Subcutaneous tissues were closed with 0 Vicryl and 2-0 Vicryl.  Skin was closed with skin staples.  A sterile dressing was applied.  The patient was carefully transported off the [place] table back onto her hospital bed.  Patient woke from  anesthesia without complication and was transferred to the recovery room in stable condition.  All surgical counts were correct.    Assistant: Symone Xie PA-C  was responsible for performing the following activities: Retraction, Suction, Irrigation, Suturing, Closing, and Placing Dressing and their skilled assistance was necessary for the success of this case.    Jesse Larson MD     Date: 10/17/2024  Time: 09:19 EDT

## 2024-10-18 VITALS
WEIGHT: 143.08 LBS | HEIGHT: 62 IN | OXYGEN SATURATION: 99 % | RESPIRATION RATE: 16 BRPM | BODY MASS INDEX: 26.33 KG/M2 | HEART RATE: 66 BPM | TEMPERATURE: 98.6 F | DIASTOLIC BLOOD PRESSURE: 66 MMHG | SYSTOLIC BLOOD PRESSURE: 116 MMHG

## 2024-10-18 LAB
ANION GAP SERPL CALCULATED.3IONS-SCNC: 11 MMOL/L (ref 5–15)
BUN SERPL-MCNC: 14 MG/DL (ref 8–23)
BUN/CREAT SERPL: 20.3 (ref 7–25)
CALCIUM SPEC-SCNC: 8.2 MG/DL (ref 8.6–10.5)
CHLORIDE SERPL-SCNC: 97 MMOL/L (ref 98–107)
CO2 SERPL-SCNC: 22 MMOL/L (ref 22–29)
CREAT SERPL-MCNC: 0.69 MG/DL (ref 0.57–1)
DEPRECATED RDW RBC AUTO: 43 FL (ref 37–54)
EGFRCR SERPLBLD CKD-EPI 2021: 98.9 ML/MIN/1.73
ERYTHROCYTE [DISTWIDTH] IN BLOOD BY AUTOMATED COUNT: 13.2 % (ref 12.3–15.4)
GLUCOSE SERPL-MCNC: 93 MG/DL (ref 65–99)
HCT VFR BLD AUTO: 33.2 % (ref 34–46.6)
HGB BLD-MCNC: 10.7 G/DL (ref 12–15.9)
MCH RBC QN AUTO: 28.5 PG (ref 26.6–33)
MCHC RBC AUTO-ENTMCNC: 32.2 G/DL (ref 31.5–35.7)
MCV RBC AUTO: 88.5 FL (ref 79–97)
PLATELET # BLD AUTO: 190 10*3/MM3 (ref 140–450)
PMV BLD AUTO: 10.9 FL (ref 6–12)
POTASSIUM SERPL-SCNC: 4 MMOL/L (ref 3.5–5.2)
RBC # BLD AUTO: 3.75 10*6/MM3 (ref 3.77–5.28)
SODIUM SERPL-SCNC: 130 MMOL/L (ref 136–145)
WBC NRBC COR # BLD AUTO: 11.41 10*3/MM3 (ref 3.4–10.8)

## 2024-10-18 PROCEDURE — 80048 BASIC METABOLIC PNL TOTAL CA: CPT | Performed by: STUDENT IN AN ORGANIZED HEALTH CARE EDUCATION/TRAINING PROGRAM

## 2024-10-18 PROCEDURE — 97116 GAIT TRAINING THERAPY: CPT

## 2024-10-18 PROCEDURE — 25010000002 KETOROLAC TROMETHAMINE PER 15 MG: Performed by: STUDENT IN AN ORGANIZED HEALTH CARE EDUCATION/TRAINING PROGRAM

## 2024-10-18 PROCEDURE — 97530 THERAPEUTIC ACTIVITIES: CPT

## 2024-10-18 PROCEDURE — 99024 POSTOP FOLLOW-UP VISIT: CPT | Performed by: STUDENT IN AN ORGANIZED HEALTH CARE EDUCATION/TRAINING PROGRAM

## 2024-10-18 PROCEDURE — 97150 GROUP THERAPEUTIC PROCEDURES: CPT

## 2024-10-18 PROCEDURE — 99214 OFFICE O/P EST MOD 30 MIN: CPT | Performed by: PHYSICIAN ASSISTANT

## 2024-10-18 PROCEDURE — 97535 SELF CARE MNGMENT TRAINING: CPT

## 2024-10-18 PROCEDURE — 85027 COMPLETE CBC AUTOMATED: CPT | Performed by: PHYSICIAN ASSISTANT

## 2024-10-18 RX ORDER — ACETAMINOPHEN 500 MG
1000 TABLET ORAL EVERY 8 HOURS
Qty: 60 TABLET | Refills: 0 | Status: SHIPPED | OUTPATIENT
Start: 2024-10-18 | End: 2024-10-28

## 2024-10-18 RX ORDER — TRAMADOL HYDROCHLORIDE 50 MG/1
50 TABLET ORAL EVERY 4 HOURS PRN
Qty: 30 TABLET | Refills: 0 | Status: SHIPPED | OUTPATIENT
Start: 2024-10-18 | End: 2024-10-23

## 2024-10-18 RX ORDER — ASPIRIN 325 MG
325 TABLET, DELAYED RELEASE (ENTERIC COATED) ORAL EVERY 12 HOURS SCHEDULED
Qty: 60 TABLET | Refills: 0 | Status: SHIPPED | OUTPATIENT
Start: 2024-10-18 | End: 2024-11-17

## 2024-10-18 RX ORDER — AMOXICILLIN 250 MG
2 CAPSULE ORAL 2 TIMES DAILY
Qty: 20 TABLET | Refills: 0 | Status: SHIPPED | OUTPATIENT
Start: 2024-10-18

## 2024-10-18 RX ADMIN — SENNOSIDES AND DOCUSATE SODIUM 2 TABLET: 50; 8.6 TABLET ORAL at 08:16

## 2024-10-18 RX ADMIN — Medication 10 ML: at 08:16

## 2024-10-18 RX ADMIN — FERROUS SULFATE TAB 325 MG (65 MG ELEMENTAL FE) 325 MG: 325 (65 FE) TAB at 08:15

## 2024-10-18 RX ADMIN — ASPIRIN 325 MG: 325 TABLET, COATED ORAL at 08:15

## 2024-10-18 RX ADMIN — POLYETHYLENE GLYCOL 3350 17 G: 17 POWDER, FOR SOLUTION ORAL at 08:16

## 2024-10-18 RX ADMIN — Medication 250 MG: at 08:15

## 2024-10-18 RX ADMIN — CITALOPRAM HYDROBROMIDE 10 MG: 20 TABLET ORAL at 08:15

## 2024-10-18 RX ADMIN — OXYCODONE HYDROCHLORIDE 5 MG: 5 TABLET ORAL at 06:29

## 2024-10-18 RX ADMIN — KETOROLAC TROMETHAMINE 15 MG: 15 INJECTION, SOLUTION INTRAMUSCULAR; INTRAVENOUS at 05:40

## 2024-10-18 RX ADMIN — FAMOTIDINE 40 MG: 20 TABLET ORAL at 08:15

## 2024-10-18 RX ADMIN — KETOROLAC TROMETHAMINE 15 MG: 15 INJECTION, SOLUTION INTRAMUSCULAR; INTRAVENOUS at 00:35

## 2024-10-18 NOTE — PLAN OF CARE
Goal Outcome Evaluation:   Patient has had no new changes throughout shift and continues to remain stable. Patient is POD1 after having a left MARIA A performed by Dr. Larson. Patient has had no complaints of pain during shift. Patient is x1 assist with walker and gait belt. Patient has voided without issue. Patient will DC home today with spouse as ride. Patient will use meds to bed and will need DME walker. Patient will be doing outpatient therapy 10/21 at 9 am at  [facility].
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
## 2024-10-18 NOTE — PROGRESS NOTES
[facility]     Progress Note    Patient Name: Lisa Florence  : 1965  MRN: [id number]  Primary Care Physician:  Jenny Lanier APRN  Date of admission: 10/17/2024    Subjective   Subjective     HPI:  No events overnight.  Pain well-controlled.  Up and ambulatory.  Was drowsy yesterday and had difficulty working with PT but now improved denies chest pain or shortness of breath.  Voiding without difficulty.    Review of Systems   See HPI    Objective   Objective     Vitals:   Temp:  [96.2 °F (35.7 °C)-98.1 °F (36.7 °C)] 98.1 °F (36.7 °C)  Heart Rate:  [53-71] 62  Resp:  [15-16] 16  BP: ()/(54-84) 112/67  Flow (L/min) (Oxygen Therapy):  [1-2] 1  Physical Exam    General: Alert, no acute distress   Left lower extremity: Dressing is clean and dry.  Leg lengths symmetric.  Calf nontender.  Distal neurovascular intact.    Result Review      WBC   Date Value Ref Range Status   10/18/2024 11.41 (H) 3.40 - 10.80 10*3/mm3 Final     RBC   Date Value Ref Range Status   10/18/2024 3.75 (L) 3.77 - 5.28 10*6/mm3 Final     Hemoglobin   Date Value Ref Range Status   10/18/2024 10.7 (L) 12.0 - 15.9 g/dL Final     Hematocrit   Date Value Ref Range Status   10/18/2024 33.2 (L) 34.0 - 46.6 % Final     MCV   Date Value Ref Range Status   10/18/2024 88.5 79.0 - 97.0 fL Final     MCH   Date Value Ref Range Status   10/18/2024 28.5 26.6 - 33.0 pg Final     MCHC   Date Value Ref Range Status   10/18/2024 32.2 31.5 - 35.7 g/dL Final     RDW   Date Value Ref Range Status   10/18/2024 13.2 12.3 - 15.4 % Final     RDW-SD   Date Value Ref Range Status   10/18/2024 43.0 37.0 - 54.0 fl Final     MPV   Date Value Ref Range Status   10/18/2024 10.9 6.0 - 12.0 fL Final     Platelets   Date Value Ref Range Status   10/18/2024 190 140 - 450 10*3/mm3 Final        Result Review:  I have personally reviewed the results from the time of this admission to 10/18/2024 07:30 EDT and agree with these findings:  [x]   Laboratory  []  Microbiology  [x]  Radiology  []  EKG/Telemetry   []  Cardiology/Vascular   []  Pathology  []  Old records  []  Other:      Assessment & Plan   Assessment / Plan     Brief Patient Summary:  Lisa Florence is a 59 y.o. female status post left total hip arthroplasty on 10/17    Active Hospital Problems:  Active Hospital Problems    Diagnosis     **Primary osteoarthritis of left hip     Arthritis of left hip      Plan:   X-rays reviewed: No hardware complication  Pain control  Postoperative antibiotic  DVT prophylaxis  Weight-bear as tolerated  PT/OT  Further care per primary team, appreciate assist  Dispo: Anticipate discharge home today.  2-week follow-up for reevaluation.       VTE Prophylaxis:  Mechanical VTE prophylaxis orders are present.        CODE STATUS:   Code Status (Patient has no pulse and is not breathing): CPR (Attempt to Resuscitate)  Medical Interventions (Patient has pulse or is breathing): Full Support        Electronically signed by Jesse Larson MD, 10/18/24, 7:30 AM EDT.
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
## 2024-10-18 NOTE — PROGRESS NOTES
[facility]   Hospitalist Progress Note       Patient Name: Lisa Florence  : 1965  MRN: [id number]  Primary Care Physician: Jenny Lanier APRN  Date of admission: 10/17/2024  Today's Date: 10/18/2024  Room / Bed:   223/1  Subjective   Chief Complaint: Medical management     HPI:     Lisa Florence is a 59 y.o. female who is being seen by hospitalist for general medical management of chronic medical issues including HTN, anxiety/depression, dyslipidemia, IBS, and psoriasis. She also has degenerative joint/osteoarthritis left hip and had left hip replaced 10/17/24 by Dr. Larson.  Recent lab work reviewed and includes creatinine 0.79, hemoglobin 13.6, A1c 5.2.  Current vital signs include BP range 88//59.  99% sats on 2 L.  P53 bpm.  T97.8.       Interval Followup: 10/18/2024    Rested reasonably well last night   No nausea or vomiting today  No chest pain shortness of air palpitations or lightheadedness reported.    Left hip pain reasonably controlled  Morning creatinine 0.69  BP range 116//62  Sats 98% room air      REVIEW OF SYSTEMS:   Left hip pain  Objective   Temp:  [97.3 °F (36.3 °C)-98.6 °F (37 °C)] 98.6 °F (37 °C)  Heart Rate:  [53-66] 66  Resp:  [16] 16  BP: ()/(54-73) 116/66  Flow (L/min) (Oxygen Therapy):  [1-2] 1  PHYSICAL EXAM   CON: WN. WD. NAD.   NECK:  No thyromegaly. No stridor.   RESP:  CTA. No wheezes. No crackles.  No work of breathing or tachypnea.   CV:  Rhythm regular. Rate WNL. No murmur noted.  No edema.  GI:  Soft and nontender. Nondistended.    EXT: L LE intact neurovascularly  PSYCH:  Alert. Oriented. Normal affect and mood.  NEURO:  No dysarthria or aphasia. No unilateral weakness or paresthesia.  SKIN: No chronic venous stasis changes or varicosities.  No cellulitis  Results from last 7 days   Lab Units 10/18/24  0401   WBC 10*3/mm3 11.41*   HEMOGLOBIN g/dL 10.7*   HEMATOCRIT % 33.2*   PLATELETS 10*3/mm3 190     Results from last 7  days   Lab Units 10/18/24  0401   SODIUM mmol/L 130*   POTASSIUM mmol/L 4.0   CO2 mmol/L 22.0   CHLORIDE mmol/L 97*   ANION GAP mmol/L 11.0   BUN mg/dL 14   CREATININE mg/dL 0.69   GLUCOSE mg/dL 93         COMPLEXITY OF DATA / DECISION MAKING     []  Moderate: One acute illness or mild exacerbation of chronic or 2 stable chronic or tx side effects   []  High:  Severe acute illness or severe exacerbation of chronic - potential for major debility / life threatening         I have personally reviewed the results from the time of this admission to 10/18/2024 12:44 EDT:  []  Laboratory:  []  Microbiology: []  Radiology:  []  Telemetry:   []  Cardiology/Vascular:  []  Pathology:  []  Prior external records:  []  Independent historian provided additional details:      []  Discussed case with specialists:    []  Independent interpretation of ECG/Imaging etc:             []  Moderate: Rx management, low risk surgery, suboptimal social situation   []  High:  Rx with close monitoring for toxicity, mod-high risk surgery, DNR decision, Comfort initiated, IV pain meds    Assessment / Plan   Assessment:     Medical management  HTN with recent hypotension  Recent nausea  Dyslipidemia  Anxiety/depression  IBS  Degenerative joint/OA left hip  Left hip replacement 10/17/24 by Dr. Larson     Plan:     Continue home medical regimen, except plan to resume triamterene/HCTZ in 48 hours.  Monitor BP trend at home and monitor for any dizziness.  Discuss readings with PCP at routine follow-up.    Recommend daily physical therapy  DVT prophylaxis per orthopedist  Pain med Rx per orthopedist  VTE Prophylaxis:  Mechanical VTE prophylaxis orders are present.      CODE STATUS:      Code Status (Patient has no pulse and is not breathing): CPR (Attempt to Resuscitate)  Medical Interventions (Patient has pulse or is breathing): Full Support       Electronically signed by MADHU Winter, 10/18/24, 12:44 PM EDT.

## 2024-10-18 NOTE — PLAN OF CARE
Goal Outcome Evaluation:  Plan of Care Reviewed With: patient        Progress: improving  Outcome Evaluation: pt medicated x1 for nausea with voiced relief. pt medicated with scheduled and prn pain medcation for left hip pain with voiced relief. pt up to bathroom with 1 assist/gait belt/walker, voiding without difficulty.no changes in pt's status.
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
## 2024-10-21 ENCOUNTER — TREATMENT (OUTPATIENT)
Dept: PHYSICAL THERAPY | Facility: CLINIC | Age: 59
End: 2024-10-21
Payer: COMMERCIAL

## 2024-10-21 DIAGNOSIS — R26.2 DIFFICULTY WALKING: ICD-10-CM

## 2024-10-21 DIAGNOSIS — M25.552 LEFT HIP PAIN: ICD-10-CM

## 2024-10-21 DIAGNOSIS — Z96.642 S/P TOTAL LEFT HIP ARTHROPLASTY: Primary | ICD-10-CM

## 2024-10-21 PROCEDURE — 97161 PT EVAL LOW COMPLEX 20 MIN: CPT | Performed by: PHYSICAL THERAPIST

## 2024-10-21 PROCEDURE — 97110 THERAPEUTIC EXERCISES: CPT | Performed by: PHYSICAL THERAPIST
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
## 2024-10-24 ENCOUNTER — TREATMENT (OUTPATIENT)
Dept: PHYSICAL THERAPY | Facility: CLINIC | Age: 59
End: 2024-10-24
Payer: COMMERCIAL

## 2024-10-24 DIAGNOSIS — M25.552 LEFT HIP PAIN: ICD-10-CM

## 2024-10-24 DIAGNOSIS — Z96.642 S/P TOTAL LEFT HIP ARTHROPLASTY: Primary | ICD-10-CM

## 2024-10-24 DIAGNOSIS — R26.2 DIFFICULTY WALKING: ICD-10-CM

## 2024-10-24 NOTE — PROGRESS NOTES
Physical Therapy Daily Treatment Note                      [facility] [address] [place] [address]    Patient: Lisa Florence   : 1965  Diagnosis/ICD-10 Code:  S/P total left hip arthroplasty [Z96.642]  Referring practitioner: Jesse Larson MD  Date of Initial Visit: Type: THERAPY  Noted: 10/21/2024  Today's Date: 10/24/2024  Patient seen for 2 sessions           Subjective   The patient reported no new complaints today. She feels like her hip is coming along well.     Objective   See Exercise, Manual, and Modality Logs for complete treatment.     Assessment/Plan    The patient demonstrated good tolerance to all functional hip strengthening and balance training exercise today. She demonstrated good gait mechanics without an AD when transitioning between exercises. Continue to progress with current PT plan of care per patient tolerance.         Timed:  Manual Therapy:    0     mins  27998;  Therapeutic Exercise:    18     mins  72204;     Neuromuscular Arti:   6    mins  93572;    Therapeutic Activity:     10     mins  86416;     Gait Trainin     mins  60173;     Aquatics                         0      mins  40016    Un-timed:  Mechanical Traction      0     mins  78064  Dry Needling     0     mins self-pay  Electrical Stimulation:    0     mins  57956 ( );      Timed Treatment:   34   mins   Total Treatment:     34   mins    Herve Ambrosio PT  Physical Therapist    Electronically signed 10/24/2024    [place] License: PT - [id number]

## 2024-10-29 ENCOUNTER — TREATMENT (OUTPATIENT)
Dept: PHYSICAL THERAPY | Facility: CLINIC | Age: 59
End: 2024-10-29
Payer: COMMERCIAL

## 2024-10-29 DIAGNOSIS — Z96.642 S/P TOTAL LEFT HIP ARTHROPLASTY: Primary | ICD-10-CM

## 2024-10-29 DIAGNOSIS — M25.552 LEFT HIP PAIN: ICD-10-CM

## 2024-10-29 DIAGNOSIS — R26.2 DIFFICULTY WALKING: ICD-10-CM

## 2024-10-29 NOTE — PROGRESS NOTES
Physical Therapy Daily Treatment Note      Patient: Lisa Florence   : 1965  Referring practitioner: Jesse Larson MD  Date of Initial Visit: Type: THERAPY  Noted: 10/21/2024  Today's Date: 10/29/2024  Patient seen for 3 sessions         Subjective Questionnaire:       Subjective Evaluation    History of Present Illness    Subjective comment: Pt reports she really hasn't had any L hip pain.  Pt reports her dressing will come off tomorrow  at MD appointment.       Objective   See Exercise, Manual, and Modality Logs for complete treatment.       Assessment & Plan       Assessment  Assessment details: Pt tolerated strengthening well.  Pt tolerated passive range of motion stretching well.  Pt reports her most difficult task is L hip external rotation and reaching L foot to R knee.  Continue with POC.        Visit Diagnoses:    ICD-10-CM ICD-9-CM   1. S/P total left hip arthroplasty  Z96.642 V43.64   2. Left hip pain  M25.552 719.45   3. Difficulty walking  R26.2 719.7       Progress per Plan of Care and Progress strengthening /stabilization /functional activity           Timed:  Manual Therapy:    8     mins  59728;  Therapeutic Exercise:    15     mins  09298;     Neuromuscular Arti:        mins  70532;    Therapeutic Activity:     8     mins  98166;     Gait Training:           mins  57705;     Ultrasound:          mins  80253;    Electrical Stimulation:         mins  42446 ( );  Aquatic Therapy          mins  56479    Untimed:  Electrical Stimulation:         mins  57210 ( );  Mechanical Traction:         mins  08251;     Timed Treatment:   31   mins   Total Treatment:     31   mins    Electronically signed    Lisseth Hill PTA  Physical Therapist Assistant    JORD[place] license: [id number]

## 2024-10-30 ENCOUNTER — OFFICE VISIT (OUTPATIENT)
Dept: ORTHOPEDIC SURGERY | Facility: CLINIC | Age: 59
End: 2024-10-30
Payer: COMMERCIAL

## 2024-10-30 VITALS
HEIGHT: 62 IN | WEIGHT: 137.4 LBS | SYSTOLIC BLOOD PRESSURE: 126 MMHG | HEART RATE: 85 BPM | DIASTOLIC BLOOD PRESSURE: 74 MMHG | OXYGEN SATURATION: 99 % | BODY MASS INDEX: 25.28 KG/M2

## 2024-10-30 DIAGNOSIS — Z47.1 AFTERCARE FOLLOWING LEFT HIP JOINT REPLACEMENT SURGERY: ICD-10-CM

## 2024-10-30 DIAGNOSIS — Z96.642 AFTERCARE FOLLOWING LEFT HIP JOINT REPLACEMENT SURGERY: ICD-10-CM

## 2024-10-30 DIAGNOSIS — Z96.642 STATUS POST TOTAL REPLACEMENT OF LEFT HIP: Primary | ICD-10-CM

## 2024-10-30 LAB
QT INTERVAL: 410 MS
QTC INTERVAL: 412 MS

## 2024-10-30 PROCEDURE — 99024 POSTOP FOLLOW-UP VISIT: CPT | Performed by: PHYSICIAN ASSISTANT

## 2024-10-30 NOTE — PROGRESS NOTES
"Chief Complaint  Follow-up of the Left Hip and Suture / Staple Removal    Subjective          Lisa Florence presents to [facility]   History of Present Illness     Lisa Florence presents today for a follow-up of her left hip.  Patient is 2 weeks postop left total hip arthroplasty performed by Dr. Larson on 10/17/2024.  Today, patient states that she is doing well.  She reports minimal to no pain following surgery.  She explains that when she does have anything is more just an ache.  She has been attending physical therapy at [facility] on [address].  She is not taking any pain medications.  She does take Tylenol only as needed.  She is ambulating with no assistive devices.  Denies lower extremity numbness or tingling.  Denies complications, notes, drainage from her incision site.  Denies fever or chills.  Patient is taking aspirin for DVT prophylaxis.    No Known Allergies     Social History     Socioeconomic History    Marital status:    Tobacco Use    Smoking status: Never    Smokeless tobacco: Never   Vaping Use    Vaping status: Never Used   Substance and Sexual Activity    Alcohol use: Not Currently     Alcohol/week: 1.0 standard drink of alcohol     Types: 1 Glasses of wine per week    Drug use: Never    Sexual activity: Defer     Partners: Male     Birth control/protection: Post-menopausal, Hysterectomy        I reviewed the patient's chief complaint, history of present illness, review of systems, past medical history, surgical history, family history, social history, medications, and allergy list.     REVIEW OF SYSTEMS    Constitutional: Denies fevers, chills, weight loss  Cardiovascular: Denies chest pain, shortness of breath  Skin: Denies rashes, acute skin changes  Neurologic: Denies headache, loss of consciousness  MSK: Left hip pain      Objective   Vital Signs:   /74   Pulse 85   Ht 157.5 cm (62\")   Wt 62.3 kg (137 lb 6.4 oz)   SpO2 99%   BMI " 25.13 kg/m²     Body mass index is 25.13 kg/m².    Physical Exam    General: Alert, no acute distress  Left lower extremity: Staples removed today without complications.  Anterior hip incision is healing appropriately.  No redness or active drainage noted.  No concerning signs of infection.  No swelling. No pain with passive hip ROM.  No pain with passive logroll the hip.  Knee extensor mechanism intact. 5/5 hip abduction.  5 out of 5 hip flexion.  Hip flexion intact.  Leg lengths appear symmetric.  Calf soft, nontender.  Demonstrates active ankle plantarflexion and dorsiflexion.  Sensation intact over the dorsal and plantar foot.  Palpable pedal pulses.    Procedures    Imaging Results (Most Recent)       Procedure Component Value Units Date/Time    XR Hip With or Without Pelvis 2 - 3 View Left [[id number]] Resulted: 10/30/24 1057     Updated: 10/30/24 1057    Narrative:      `Indications: Follow-up left total hip arthroplasty    Views: AP and frog lateral left hip    Findings: Press-fit left total hip arthroplasty implants in place.    Implant positioning remains stable compared to immediate postoperative   films.  No hardware loosening or complications.  No periprosthetic   fractures.  Hip is reduced.    Comparative Data: Comparative data found and reviewed today.                     Assessment and Plan    Diagnoses and all orders for this visit:    1. Status post total replacement of left hip (Primary)    2. Aftercare following left hip joint replacement surgery  -     XR Hip With or Without Pelvis 2 - 3 View Left        Lisa Florence presents today 2 weeks status post left total hip arthroplasty performed by Dr. Larson on 10/17/2024. X-rays were reviewed with the patient today.  Staples removed today without complications. Incision is well healing without active drainage or redness noted. No concerning signs of infection. Patient denies fever or chills. Incision site care was reviewed today. Patient  instructed not to soak or submerge incision. Do not apply any lotions, creams, or ointments to the incision at this time.  We discussed concerning signs and symptoms regarding the incision site.  Patient understood and agreed. Patient instructed to continue with formal physical therapy at [facility]. We discussed the importance of these exercises. We discussed swelling management with ice, elevation, and compressive wraps as needed.  Patient will continue with Tylenol as needed.  Patient will continue aspirin for DVT prophylaxis until 4 weeks post op. Patient expressed understanding.     Patient will follow up in 4 weeks for reevaluation. We will obtain new x-rays of the left hip at next visit.       Call or return if symptoms worsen or patient has any concerns.       Follow Up   Return in about 4 weeks (around 11/27/2024).  Patient was given instructions and counseling regarding her condition or for health maintenance advice. Please see specific information pulled into the AVS if appropriate.       Symone Xie PA-C  10/30/24  12:50 EDT

## 2024-11-01 ENCOUNTER — TREATMENT (OUTPATIENT)
Dept: PHYSICAL THERAPY | Facility: CLINIC | Age: 59
End: 2024-11-01
Payer: COMMERCIAL

## 2024-11-01 DIAGNOSIS — Z96.642 S/P TOTAL LEFT HIP ARTHROPLASTY: Primary | ICD-10-CM

## 2024-11-01 DIAGNOSIS — R26.2 DIFFICULTY WALKING: ICD-10-CM

## 2024-11-01 DIAGNOSIS — M25.552 LEFT HIP PAIN: ICD-10-CM

## 2024-11-01 NOTE — PROGRESS NOTES
Physical Therapy Daily Treatment Note                      [facility] [address] [facility] [place] [address]    Patient: Lisa Florence   : 1965  Diagnosis/ICD-10 Code:  S/P total left hip arthroplasty [Z96.642]  Referring practitioner: Jesse Larson MD  Date of Initial Visit: Type: THERAPY  Noted: 10/21/2024  Today's Date: 2024  Patient seen for 4 sessions           Subjective   The patient reported that her hip is doing better. She still experiences a frequent burning sensation down into her quadriceps. She also still has some left knee pain. She has noticed that she can pick her left leg up much more easily now that she could before surgery.    Objective   See Exercise, Manual, and Modality Logs for complete treatment.     Assessment/Plan    The patient demonstrated good tolerance to all functional hip/knee strengthening exercise. Continue to progress with current PT plan of care per patient tolerance.         Timed:  Manual Therapy:    0     mins  54096;  Therapeutic Exercise:    10     mins  99279;     Neuromuscular Arti:   8    mins  23226;    Therapeutic Activity:     12     mins  88736;     Gait Trainin     mins  35376;     Aquatics                         0      mins  58865    Un-timed:  Mechanical Traction      0     mins  66102  Dry Needling     0     mins self-pay  Electrical Stimulation:    0     mins  63114 ( );      Timed Treatment:   30   mins   Total Treatment:     30   mins    Herve Ambrosio PT  Physical Therapist    Electronically signed 2024    [place] License: PT - [id number]

## 2024-11-05 ENCOUNTER — TREATMENT (OUTPATIENT)
Dept: PHYSICAL THERAPY | Facility: CLINIC | Age: 59
End: 2024-11-05
Payer: COMMERCIAL

## 2024-11-05 DIAGNOSIS — R26.2 DIFFICULTY WALKING: ICD-10-CM

## 2024-11-05 DIAGNOSIS — Z96.642 S/P TOTAL LEFT HIP ARTHROPLASTY: Primary | ICD-10-CM

## 2024-11-05 DIAGNOSIS — M25.552 LEFT HIP PAIN: ICD-10-CM

## 2024-11-05 NOTE — PROGRESS NOTES
Physical Therapy Daily Treatment Note      Patient: Lisa Florence   : 1965  Referring practitioner: Jesse Larson MD  Date of Initial Visit: Type: THERAPY  Noted: 10/21/2024  Today's Date: 2024  Patient seen for 5 sessions           Subjective Questionnaire:       Subjective Evaluation    History of Present Illness    Subjective comment: Pt reports not havingany L hip pain but does have quad acheyness.  Pt reports her L quads are not working right.       Objective   See Exercise, Manual, and Modality Logs for complete treatment.       Assessment & Plan       Assessment  Assessment details: Pt tolerated strengthening well.  Pt's gait has improved with less LLE limp.  Pt reports not being limited in daily activity.  Pt will benefit from continued strengthening.         Visit Diagnoses:    ICD-10-CM ICD-9-CM   1. S/P total left hip arthroplasty  Z96.642 V43.64   2. Left hip pain  M25.552 719.45   3. Difficulty walking  R26.2 719.7       Progress per Plan of Care and Progress strengthening /stabilization /functional activity           Timed:  Manual Therapy:         mins  28846;  Therapeutic Exercise:    17     mins  86532;     Neuromuscular Arti:        mins  67949;    Therapeutic Activity:     8     mins  30390;     Gait Training:           mins  67656;     Ultrasound:          mins  83225;    Electrical Stimulation:         mins  63825 ( );  Aquatic Therapy          mins  30117    Untimed:  Electrical Stimulation:         mins  05969 ( );  Mechanical Traction:         mins  81231;     Timed Treatment:   25   mins   Total Treatment:     25   mins    Electronically signed    Lisseth Hill PTA  Physical Therapist Assistant    [place]ORD[place] license: [id number]

## 2024-11-08 ENCOUNTER — TREATMENT (OUTPATIENT)
Dept: PHYSICAL THERAPY | Facility: CLINIC | Age: 59
End: 2024-11-08
Payer: COMMERCIAL

## 2024-11-08 DIAGNOSIS — M25.552 LEFT HIP PAIN: ICD-10-CM

## 2024-11-08 DIAGNOSIS — R26.2 DIFFICULTY WALKING: ICD-10-CM

## 2024-11-08 DIAGNOSIS — Z96.642 S/P TOTAL LEFT HIP ARTHROPLASTY: Primary | ICD-10-CM

## 2024-11-08 NOTE — PROGRESS NOTES
"Physical Therapy Daily Treatment Note      Patient: Lisa Florence   : 1965  Referring practitioner: Jesse Larson MD  Date of Initial Visit: Type: THERAPY  Noted: 10/21/2024  Today's Date: 2024  Patient seen for 6 sessions           Subjective Questionnaire:       Subjective Evaluation    History of Present Illness    Subjective comment: Pt reported being sore after last session that evening and the next day.  Pt reported she was performing supine L hip abduction/adduction and got pain at mid \"but cheek.\"       Objective   See Exercise, Manual, and Modality Logs for complete treatment.       Assessment & Plan       Assessment  Assessment details: Pt tolerated strengthening well without report of increased pain or discomfort.  Pt report continued discomfort in mid sacrum area.  Pt reports she was performing supine hip abduction when this muscular pain began.  Pt ambulates without AD and with antalgic gait.  Continue with POC.        Visit Diagnoses:    ICD-10-CM ICD-9-CM   1. S/P total left hip arthroplasty  Z96.642 V43.64   2. Left hip pain  M25.552 719.45   3. Difficulty walking  R26.2 719.7       Progress per Plan of Care and Progress strengthening /stabilization /functional activity           Timed:  Manual Therapy:    8     mins  10149;  Therapeutic Exercise:    20     mins  79531;     Neuromuscular Arti:        mins  52422;    Therapeutic Activity:     9     mins  76293;     Gait Training:           mins  54336;     Ultrasound:          mins  94610;    Electrical Stimulation:         mins  10327 ( );  Aquatic Therapy          mins  28440    Untimed:  Electrical Stimulation:         mins  46739 ( );  Mechanical Traction:         mins  69320;     Timed Treatment:   37   mins   Total Treatment:     37   mins    Electronically signed    Lisseth Hill PTA  Physical Therapist Assistant    [place]ORD[place] license: [id number]    "

## 2024-11-12 ENCOUNTER — TREATMENT (OUTPATIENT)
Dept: PHYSICAL THERAPY | Facility: CLINIC | Age: 59
End: 2024-11-12
Payer: COMMERCIAL

## 2024-11-12 DIAGNOSIS — M25.552 LEFT HIP PAIN: ICD-10-CM

## 2024-11-12 DIAGNOSIS — Z96.642 S/P TOTAL LEFT HIP ARTHROPLASTY: Primary | ICD-10-CM

## 2024-11-12 DIAGNOSIS — R26.2 DIFFICULTY WALKING: ICD-10-CM

## 2024-11-12 NOTE — PROGRESS NOTES
Physical Therapy Daily Treatment Note                      [address] [address] [facility] [place] [address]    Patient: Lisa Florence   : 1965  Diagnosis/ICD-10 Code:  S/P total left hip arthroplasty [Z96.642]  Referring practitioner: Jesse Larson MD  Date of Initial Visit: Type: THERAPY  Noted: 10/21/2024  Today's Date: 2024  Patient seen for 7 sessions           Subjective   The patient reported that her hip has been hurting more for the past 4-5 days. It started as pain in her left glut with supine hip abduction exercise. That pain subsided and then she noticed pain/stiffness in the front/side of her hip as well. She noticed that her gait isn't as smooth.     Objective   See Exercise, Manual, and Modality Logs for complete treatment.     Assessment/Plan    The patient arrived with a more antalgic gait and increased complaints of hip pain today. We started with manual therapy directed towards soft tissue release in the gluteal region. Following manual therapy, pain and gait were each improved, but not fully. Exercise intensity was reduced.          Timed:  Manual Therapy:    14     mins  59980;  Therapeutic Exercise:    6     mins  32364;     Neuromuscular Arti:   0    mins  47670;    Therapeutic Activity:     10     mins  74537;     Gait Trainin     mins  48101;     Aquatics                         0      mins  18412    Un-timed:  Mechanical Traction      0     mins  95889  Dry Needling     0     mins self-pay  Electrical Stimulation:    0     mins  11440 ( );      Timed Treatment:   30   mins   Total Treatment:     30   mins    Herve Ambrosio PT  Physical Therapist    Electronically signed 2024    [place] License: PT - [id number]

## 2024-11-15 ENCOUNTER — TREATMENT (OUTPATIENT)
Dept: PHYSICAL THERAPY | Facility: CLINIC | Age: 59
End: 2024-11-15
Payer: COMMERCIAL

## 2024-11-15 DIAGNOSIS — R26.2 DIFFICULTY WALKING: ICD-10-CM

## 2024-11-15 DIAGNOSIS — Z96.642 S/P TOTAL LEFT HIP ARTHROPLASTY: Primary | ICD-10-CM

## 2024-11-15 DIAGNOSIS — M25.552 LEFT HIP PAIN: ICD-10-CM

## 2024-11-15 NOTE — PROGRESS NOTES
Physical Therapy Daily Treatment Note      Patient: Lisa Florence   : 1965  Referring practitioner: Jesse Larson MD  Date of Initial Visit: Type: THERAPY  Noted: 10/21/2024  Today's Date: 11/15/2024  Patient seen for 8 sessions           Subjective Questionnaire:       Subjective Evaluation    History of Present Illness    Subjective comment: Pt reports her L hip feels tight today.       Objective   See Exercise, Manual, and Modality Logs for complete treatment.       Assessment & Plan       Assessment  Assessment details: Pt tolerated strengthening well without report of increased pain or discomfort.  Pt's gait exhibits less limp today and pt reported it felt better than in the past. Pt reports that practicing hip abduction is what hurt her hip and it is still tight.  Pt will benefit from continued PT.        Visit Diagnoses:    ICD-10-CM ICD-9-CM   1. S/P total left hip arthroplasty  Z96.642 V43.64   2. Left hip pain  M25.552 719.45   3. Difficulty walking  R26.2 719.7       Progress per Plan of Care and Progress strengthening /stabilization /functional activity           Timed:  Manual Therapy:         mins  46777;  Therapeutic Exercise:    14     mins  54092;     Neuromuscular Arti:        mins  89214;    Therapeutic Activity:     10     mins  60997;     Gait Training:           mins  68649;     Ultrasound:          mins  17158;    Electrical Stimulation:         mins  67704 ( );  Aquatic Therapy          mins  68444    Untimed:  Electrical Stimulation:         mins  90384 ( );  Mechanical Traction:         mins  41443;     Timed Treatment:   24   mins   Total Treatment:     24   mins    Electronically signed    Lisseth Hill PTA  Physical Therapist Assistant    [place]ORD[place] license: [id number]

## 2024-11-19 ENCOUNTER — TREATMENT (OUTPATIENT)
Dept: PHYSICAL THERAPY | Facility: CLINIC | Age: 59
End: 2024-11-19
Payer: COMMERCIAL

## 2024-11-19 DIAGNOSIS — Z96.642 S/P TOTAL LEFT HIP ARTHROPLASTY: Primary | ICD-10-CM

## 2024-11-19 DIAGNOSIS — R26.2 DIFFICULTY WALKING: ICD-10-CM

## 2024-11-19 DIAGNOSIS — M25.552 LEFT HIP PAIN: ICD-10-CM

## 2024-11-19 NOTE — PROGRESS NOTES
Progress Note / Discharge  [facility] [address] [place] [address]      Patient: Lisa Florence   : 1965  Diagnosis/ICD-10 Code:  S/P total left hip arthroplasty [Z96.642]  Referring practitioner: Jesse Larson MD  Date of Initial Visit: Type: THERAPY  Noted: 10/21/2024  Today's Date: 2024  Patient seen for 9 sessions      Subjective:   Subjective Questionnaire: LEFS: 66/80 = 82.5% Function  Clinical Progress: improved  Home Program Compliance: Yes  Treatment has included: therapeutic exercise, neuromuscular re-education, manual therapy, therapeutic activity, and gait training    Subjective     Objective          Neurological Testing     Additional Neurological Details  Sensation to light touch intact and equal from L2-S1 dermatomal pattern except for hyposensation around incision site on left hip.    Active Range of Motion   Left Hip   Flexion: 120 degrees   External rotation (90/90): 30 degrees   Internal rotation (90/90): 35 degrees     Right Hip   Flexion: 125 degrees   External rotation (90/90): 40 degrees   Internal rotation (90/90): 40 degrees     Strength/Myotome Testing     Left Hip   Planes of Motion   Flexion: 4  Abduction: 4+  Adduction: 4+    Right Hip   Planes of Motion   Flexion: 4+  Abduction: 5  Adduction: 5    Left Knee   Flexion: 5  Extension: 5  Quadriceps contraction: good    Right Knee   Flexion: 5  Extension: 5  Quadriceps contraction: good    Additional Strength Details  Hip strength tested in the seated position today.    Ambulation     Ambulation: Level Surfaces     Additional Level Surfaces Ambulation Details  The patient ambulates without an AD with slight lateral trunk lean during ipsilateral stance on Left side. She is able to self-correct with verbal cueing.      See Exercise, Manual, and Modality Logs for complete treatment.     Assessment/Plan  The patient was re-evaluated today and presents with improvements in left hip pain, strength, ROM,  gait, and function (LEFS) compared to her initial evaluation. She has progressed well with PT and is capable of discharge from PT at this time. I cautioned her to avoid heavy lifting at the gym and progress resistance as tolerated after she can perform 3 sets of 10 reps at a specific weight.    Goals  Plan Goals: HIP PROBLEMS     1. The patient complains of left hip pain.              LTG 1: 12 weeks:  The patient will report a pain rating of 1/10 or better in order to improve  tolerance to activities of daily living and improve sleep quality.                          STATUS:  Met              STG 1a: 6 weeks:  The patient will report a pain rating of 2/10 or better.                          STATUS:  Met     2. The patient demonstrates weakness of the left hip.              LTG 2: 12 weeks:  The patient will demonstrate 4+/5 strength for left hip flexion, abduction,  and adduction in order to improve hip stability.                          STATUS:  Good progress              STG 2a: 6 weeks:  The patient will demonstrate 4-/5 strength for left hip flexion, abduction,  and adduction.                          STATUS:  Met     3. The patient has gait dysfunction.              LTG 3: 12 weeks:  The patient will ambulate without assistive device, independently, for community distances with normal biomechanics in order to improve mobility and allow patient to perform activities such as grocery shopping with greater ease.                          STATUS:  Good progress              STG 3a: The patient will be independent in [facility].                          STATUS:  Met     4. Mobility: Walking/Moving Around Functional Limitation                               LTG 4: 12 weeks:  The patient will demonstrate 25% limitation by achieving a score of 60/80 on the Lower Extremity Functional Scale.                          STATUS:  Met              STG 4a: 6 weeks:  The patient will demonstrate 50% limitation by achieving a score of  40/80 on the Lower Extremity Functional Scale.                            STATUS:  Met     5. The patient has limited ROM of the left hip.              LTG 5: 12 weeks:  The patient will demonstrate left hip AROM as follows: 105 degrees of hip flexion, 30 degrees of internal rotation, and 30 degrees of external rotation.                          STATUS:  Met              STG 5a: 6 weeks:  The patient will demonstrate left hip AROM as follows: 95 degrees of hip flexion, 25 degrees of internal rotation, and 25 degrees of external rotation.                          STATUS:  Met    Progress toward previous goals: Partially Met      Recommendations: Change in plan of care to discharge.    Prognosis to achieve goals: good    PT Signature: Herve Ambrosio PT    Electronically signed 2024    [place] License: PT - [id number]       Timed:  Manual Therapy:    0     mins  29312;  Therapeutic Exercise:    0     mins  85216;     Neuromuscular Arti:    0    mins  48617;    Therapeutic Activity:     15     mins  85565;     Gait Trainin     mins  37488;     Aquatics                         0      mins  91382    Un-timed:  Mechanical Traction      0     mins  19298  Dry Needling     0     mins self-pay  Electrical Stimulation:    0     mins  99135 ( )  Re-evaluation:               10     mins 75352;    Timed Treatment:   15   mins   Total Treatment:     25   mins

## 2024-12-04 ENCOUNTER — OFFICE VISIT (OUTPATIENT)
Dept: ORTHOPEDIC SURGERY | Facility: CLINIC | Age: 59
End: 2024-12-04
Payer: COMMERCIAL

## 2024-12-04 VITALS — HEART RATE: 68 BPM | DIASTOLIC BLOOD PRESSURE: 82 MMHG | SYSTOLIC BLOOD PRESSURE: 145 MMHG | OXYGEN SATURATION: 98 %

## 2024-12-04 DIAGNOSIS — M25.552 LEFT HIP PAIN: ICD-10-CM

## 2024-12-04 DIAGNOSIS — Z96.642 STATUS POST TOTAL REPLACEMENT OF LEFT HIP: Primary | ICD-10-CM

## 2024-12-04 PROCEDURE — 99024 POSTOP FOLLOW-UP VISIT: CPT | Performed by: PHYSICIAN ASSISTANT

## 2024-12-04 NOTE — PROGRESS NOTES
"Chief Complaint  Follow-up of the Left Hip    Subjective          Lisa Florence presents to [facility]   History of Present Illness    Lisa Florence presents today for a follow-up of her left hip.  Patient is 6 weeks postop left total hip arthroplasty performed on 10/17/2024.  Today, patient states that she is doing well.  She states that her hip feels \"perfect\".  She explains that she had a portion of her incision that had what she believed to be a 1 inch paper come out of it.  She explains that she had no redness or drainage and her incision has since healed up.  She has completed formal physical therapy and doing exercises on her own.  She denies any concerns today.      No Known Allergies     Social History     Socioeconomic History    Marital status:    Tobacco Use    Smoking status: Never    Smokeless tobacco: Never   Vaping Use    Vaping status: Never Used   Substance and Sexual Activity    Alcohol use: Not Currently     Alcohol/week: 1.0 standard drink of alcohol     Types: 1 Glasses of wine per week    Drug use: Never    Sexual activity: Defer     Partners: Male     Birth control/protection: Post-menopausal, Hysterectomy        I reviewed the patient's chief complaint, history of present illness, review of systems, past medical history, surgical history, family history, social history, medications, and allergy list.     REVIEW OF SYSTEMS    Constitutional: Denies fevers, chills, weight loss  Cardiovascular: Denies chest pain, shortness of breath  Skin: Denies rashes, acute skin changes  Neurologic: Denies headache, loss of consciousness  MSK: Left hip pain      Objective   Vital Signs:   /82   Pulse 68   SpO2 98%     There is no height or weight on file to calculate BMI.    Physical Exam    General: Alert. No acute distress.   Left lower extremity: Incision is well-healed with pinpoint sized scab to the middle portion of her incision, no active " drainage, no redness, no concerning signs for infection.  Hip flexion intact.  5 out of 5 hip flexion.  5 out of 5 hip abduction.  No pain with passive hip range of motion.  No pain with passive logroll the hip.  Knee extensor mechanism intact.  Calf soft, nontender.  Demonstrates active ankle plantarflexion and dorsiflexion.  Sensation intact over dorsal and plantar foot.  Pabal pedal pulses.    Procedures    Imaging Results (Most Recent)       Procedure Component Value Units Date/Time    XR Hip With or Without Pelvis 2 - 3 View Left [[id number]] Resulted: 12/04/24 1123     Updated: 12/04/24 1123    Narrative:      Indications: Follow-up left total hip arthroplasty    Views: AP and frog lateral left hip    Findings: Left total hip arthroplasty implants in place.  Implant   positioning remains stable.  No hardware loosening or complications.  No   periprosthetic fractures.  Hip is reduced.    Comparative Data: Comparative data found and reviewed today.                   Assessment and Plan    Diagnoses and all orders for this visit:    1. Status post total replacement of left hip (Primary)    2. Left hip pain  -     XR Hip With or Without Pelvis 2 - 3 View Left        Lisa Florence presents today 6 weeks status post left total hip arthroplasty performed on 10/17/2024.  X-rays reviewed with the patient today.  Incision is well-healed without concerning signs of infection.  She is instructed to continue with home exercises.  Continue with gradual light activity as tolerated.      Patient will follow up in 6 weeks for reevaluation.  We will obtain new x-rays of the left hip at next visit.      Call or return if symptoms worsen or patient has any concerns.       Follow Up   Return in about 6 weeks (around 1/15/2025).  Patient was given instructions and counseling regarding her condition or for health maintenance advice. Please see specific information pulled into the AVS if appropriate.     Symone Xie,  MARY BETH  12/06/24  06:58 EST

## 2025-01-15 ENCOUNTER — OFFICE VISIT (OUTPATIENT)
Dept: ORTHOPEDIC SURGERY | Facility: CLINIC | Age: 60
End: 2025-01-15
Payer: COMMERCIAL

## 2025-01-15 VITALS — SYSTOLIC BLOOD PRESSURE: 125 MMHG | DIASTOLIC BLOOD PRESSURE: 71 MMHG | OXYGEN SATURATION: 99 % | HEART RATE: 61 BPM

## 2025-01-15 DIAGNOSIS — M25.552 LEFT HIP PAIN: ICD-10-CM

## 2025-01-15 DIAGNOSIS — Z96.642 STATUS POST TOTAL REPLACEMENT OF LEFT HIP: Primary | ICD-10-CM

## 2025-01-15 NOTE — PROGRESS NOTES
Chief Complaint  Follow-up of the Left Hip    Subjective          Lisa Flroence presents to [facility]   History of Present Illness    Lisa Florence presents today for a follow-up of her left hip.  Patient is 12 weeks postop left total hip arthroplasty performed on 10/17/2024.  Today, patient states that she is doing well.  She states that she has good hip range of motion and strength.  Feels that her hip is stable.  Patient states that her incision is well-healed.  Patient has no new concerns today.      No Known Allergies     Social History     Socioeconomic History    Marital status:    Tobacco Use    Smoking status: Never    Smokeless tobacco: Never   Vaping Use    Vaping status: Never Used   Substance and Sexual Activity    Alcohol use: Not Currently     Alcohol/week: 1.0 standard drink of alcohol     Types: 1 Glasses of wine per week    Drug use: Never    Sexual activity: Defer     Partners: Male     Birth control/protection: Post-menopausal, Hysterectomy        I reviewed the patient's chief complaint, history of present illness, review of systems, past medical history, surgical history, family history, social history, medications, and allergy list.     REVIEW OF SYSTEMS    Constitutional: Denies fevers, chills, weight loss  Cardiovascular: Denies chest pain, shortness of breath  Skin: Denies rashes, acute skin changes  Neurologic: Denies headache, loss of consciousness  MSK: Left hip pain      Objective   Vital Signs:   /71   Pulse 61   SpO2 99%     There is no height or weight on file to calculate BMI.    Physical Exam    General: Alert. No acute distress.   Left lower extremity: Well-healed scar with no concerning signs for infection.  Hip flexion intact.  5 out of 5 hip flexion.  5 out of 5 hip abduction.  No pain with passive hip range of motion.  No pain with passive logroll the hip.  Knee extensor mechanism intact.  Calf soft, nontender.   Demonstrates active ankle plantarflexion and dorsiflexion.     Procedures    Imaging Results (Most Recent)       Procedure Component Value Units Date/Time    XR Hip With or Without Pelvis 2 - 3 View Left [[id number]] Resulted: 01/15/25 0819     Updated: 01/15/25 0819    Narrative:      Indications: Follow-up left total hip arthroplasty    Views: AP and frog lateral left hip    Findings: Press-fit left total hip arthroplasty implants in place with   stable positioning.  No hardware loosening or complications.  No   periprosthetic fractures.  Hip is reduced.    Comparative Data: Comparative data found and reviewed today.                   Assessment and Plan    Diagnoses and all orders for this visit:    1. Status post total replacement of left hip (Primary)    2. Left hip pain  -     XR Hip With or Without Pelvis 2 - 3 View Left        Lisa Florence presents today 3 months postop left total hip arthroplasty performed on 10/17/2024.  X-rays reviewed with the patient today.  Well-healed scar with no concerning signs for infection.  Patient instructed to continue with home exercises.  Continue gradual progression of activities as tolerated.  We discussed antibiotic prophylaxis for dental procedures.      Patient will follow up in 3 months for reevaluation.  We will obtain new x-rays of the left hip at next visit.      Call or return if symptoms worsen or patient has any concerns.       Follow Up   Return in about 3 months (around 4/15/2025).  Patient was given instructions and counseling regarding her condition or for health maintenance advice. Please see specific information pulled into the AVS if appropriate.     Symone Xie PA-C  01/15/25  08:23 EST

## 2025-06-04 ENCOUNTER — OFFICE VISIT (OUTPATIENT)
Dept: ORTHOPEDIC SURGERY | Facility: CLINIC | Age: 60
End: 2025-06-04
Payer: COMMERCIAL

## 2025-06-04 VITALS
SYSTOLIC BLOOD PRESSURE: 111 MMHG | WEIGHT: 133.2 LBS | DIASTOLIC BLOOD PRESSURE: 69 MMHG | OXYGEN SATURATION: 98 % | BODY MASS INDEX: 24.51 KG/M2 | HEART RATE: 65 BPM | HEIGHT: 62 IN

## 2025-06-04 DIAGNOSIS — M25.552 LEFT HIP PAIN: ICD-10-CM

## 2025-06-04 DIAGNOSIS — Z96.642 STATUS POST TOTAL REPLACEMENT OF LEFT HIP: Primary | ICD-10-CM

## 2025-06-04 RX ORDER — AMOXICILLIN 500 MG/1
CAPSULE ORAL
Qty: 4 CAPSULE | Refills: 0 | Status: SHIPPED | OUTPATIENT
Start: 2025-06-04

## 2025-06-04 NOTE — PROGRESS NOTES
"Chief Complaint  Follow-up of the Left Hip    Subjective          Lisa Florence presents to [facility]   History of Present Illness    Lisa Florence presents today for a follow-up of her left hip.  Patient is 7.5 months postop left total hip arthroplasty performed on 10/17/2024.  Today, patient states that she is doing well.  She reports good range of motion as well as strength to her hip.  She is able to do all activities without complications from her hip.  She has no concerns today for her hip.      No Known Allergies     Social History     Socioeconomic History    Marital status:    Tobacco Use    Smoking status: Never    Smokeless tobacco: Never   Vaping Use    Vaping status: Never Used   Substance and Sexual Activity    Alcohol use: Not Currently     Alcohol/week: 1.0 standard drink of alcohol     Types: 1 Glasses of wine per week    Drug use: Never    Sexual activity: Defer     Partners: Male     Birth control/protection: Post-menopausal, Hysterectomy        I reviewed the patient's chief complaint, history of present illness, review of systems, past medical history, surgical history, family history, social history, medications, and allergy list.     REVIEW OF SYSTEMS    Constitutional: Denies fevers, chills, weight loss  Cardiovascular: Denies chest pain, shortness of breath  Skin: Denies rashes, acute skin changes  Neurologic: Denies headache, loss of consciousness  MSK: Left hip pain      Objective   Vital Signs:   /69   Pulse 65   Ht 157.5 cm (62\")   Wt 60.4 kg (133 lb 3.2 oz)   SpO2 98%   BMI 24.36 kg/m²     Body mass index is 24.36 kg/m².    Physical Exam    General: Alert. No acute distress.   Left lower extremity: Hip flexion intact.  5 out of 5 hip flexion.  5 out of 5 hip abduction.  No pain with passive hip range of motion.  No pain with passive logroll the hip.  Knee extensor mechanism intact.  Calf soft, nontender.  Demonstrates active " ankle plantarflexion dorsiflexion.  Sensation intact over dorsal and plantar foot.  Palpable pedal pulses.    Procedures    Imaging Results (Most Recent)       Procedure Component Value Units Date/Time    XR Hip With or Without Pelvis 2 - 3 View Left - Preliminary [[id number]] Resulted: 06/04/25 0755     Updated: 06/04/25 0755    This result has not been signed. Information might be incomplete.      Narrative:      Indications: Follow-up left total hip arthroplasty    Views: AP and frog lateral left hip    Findings: Press-fit left total hip arthroplasty implants in place with   stable positioning.  No evidence of loosening or complications.  No   periprosthetic fractures.  Hip is reduced.    Comparative Data: Comparative data found and reviewed today.                   Assessment and Plan    Diagnoses and all orders for this visit:    1. Status post total replacement of left hip (Primary)  -     amoxicillin (AMOXIL) 500 MG capsule; Take full prescription 1 hour prior to dental procedure.  Dispense: 4 capsule; Refill: 0    2. Left hip pain  -     XR Hip With or Without Pelvis 2 - 3 View Left        Lisa Florence presents today 7.5 months postop left total hip arthroplasty performed on 10/17/2024. X-rays reviewed with the patient today.  She is instructed to continue with home exercises.  Continue with activity as tolerated.  We discussed antibiotic prophylaxis for dental procedures.      Patient will follow up in 6 months for 1 year postop reevaluation.  We will obtain new x-rays of the left hip at next visit.      Call or return if symptoms worsen or patient has any concerns.       Follow Up   Return in about 6 months (around 12/4/2025).  Patient was given instructions and counseling regarding her condition or for health maintenance advice. Please see specific information pulled into the AVS if appropriate.     Symone Xie PA-C  06/04/25  08:32 EDT

## 2025-07-01 ENCOUNTER — TRANSCRIBE ORDERS (OUTPATIENT)
Dept: ADMINISTRATIVE | Facility: HOSPITAL | Age: 60
End: 2025-07-01
Payer: COMMERCIAL

## 2025-07-01 DIAGNOSIS — Z13.820 OSTEOPOROSIS SCREENING: ICD-10-CM

## 2025-07-01 DIAGNOSIS — Z12.31 VISIT FOR SCREENING MAMMOGRAM: Primary | ICD-10-CM

## 2025-08-06 ENCOUNTER — HOSPITAL ENCOUNTER (OUTPATIENT)
Dept: BONE DENSITY | Facility: HOSPITAL | Age: 60
Discharge: HOME | End: 2025-08-06
Payer: COMMERCIAL

## 2025-08-06 ENCOUNTER — HOSPITAL ENCOUNTER (OUTPATIENT)
Dept: MAMMOGRAPHY | Facility: HOSPITAL | Age: 60
Discharge: HOME | End: 2025-08-06
Payer: COMMERCIAL

## 2025-08-06 DIAGNOSIS — Z12.31 VISIT FOR SCREENING MAMMOGRAM: ICD-10-CM

## 2025-08-06 DIAGNOSIS — Z13.820 OSTEOPOROSIS SCREENING: ICD-10-CM

## 2025-08-06 PROCEDURE — 77063 BREAST TOMOSYNTHESIS BI: CPT

## 2025-08-06 PROCEDURE — 77067 SCR MAMMO BI INCL CAD: CPT

## 2025-08-06 PROCEDURE — 77080 DXA BONE DENSITY AXIAL: CPT

## (undated) DEVICE — STERILE POLYISOPRENE POWDER-FREE SURGICAL GLOVES WITH EMOLLIENT COATING: Brand: PROTEXIS

## (undated) DEVICE — APPL CHLORAPREP HI/LITE 26ML ORNG

## (undated) DEVICE — STRYKER PERFORMANCE SERIES SAGITTAL BLADE: Brand: STRYKER PERFORMANCE SERIES

## (undated) DEVICE — GLOVE,SURG,SENSICARE SLT,LF,PF,6: Brand: MEDLINE

## (undated) DEVICE — ELECTRD BLD EZ CLN STD 6.5IN

## (undated) DEVICE — BNDG ELAS ECON W/CLIP 6IN 5YD LF STRL

## (undated) DEVICE — SUT VIC UD BR COAT 0 CP2 27IN

## (undated) DEVICE — KT PT POSITION SUPINE HANA/PROFX TABL

## (undated) DEVICE — BNDG COBAN S/ADHR WRP LF 4IN 5YD TN

## (undated) DEVICE — DISPOSABLE TOURNIQUET CUFF SINGLE BLADDER, SINGLE PORT AND QUICK CONNECT CONNECTOR: Brand: COLOR CUFF

## (undated) DEVICE — CVR LEG BOOTLEG F/R NOSKID 33IN

## (undated) DEVICE — SYR LUERLOK 30CC

## (undated) DEVICE — GLV SURG SENSICARE PI ORTHO SZ8 LF STRL

## (undated) DEVICE — PULLOVER TOGA, 2X LARGE: Brand: FLYTE, SURGICOOL

## (undated) DEVICE — GLOVE,SURG,SENSICARE SLT,LF,PF,5.5: Brand: MEDLINE

## (undated) DEVICE — TOWEL,OR,DSP,ST,BLUE,STD,4/PK,20PK/CS: Brand: MEDLINE

## (undated) DEVICE — SUT ETHLN 3-0 FS118IN 663H

## (undated) DEVICE — MAT FLR ABS W/BLU/LINER 56X72IN WHT

## (undated) DEVICE — STERILE POLYISOPRENE POWDER-FREE SURGICAL GLOVES: Brand: PROTEXIS

## (undated) DEVICE — GOWN,REINFORCE,POLY,SIRUS,BREATH SLV,XLG: Brand: MEDLINE

## (undated) DEVICE — SOL IRR NACL 0.9PCT 3000ML

## (undated) DEVICE — PENCL ES MEGADINE EZ/CLEAN BUTN W/HOLSTR 10FT

## (undated) DEVICE — DRP SURG U/DRP INVISISHIELD PA 48X52IN

## (undated) DEVICE — SLV SCD KN/LEN ADJ EXPRSS BLENDED MD 1P/U

## (undated) DEVICE — GLV SURG SENSICARE SLT PF LF 6 STRL

## (undated) DEVICE — GLOVE,SURG,SENSICARE SLT,LF,PF,8: Brand: MEDLINE

## (undated) DEVICE — PCH INST SURG INVISISHIELD 2PCKT

## (undated) DEVICE — Device

## (undated) DEVICE — GAUZE,SPONGE,4"X4",16PLY,STRL,LF,10/TRAY: Brand: MEDLINE

## (undated) DEVICE — BNDG ELAS ECON W/CLIP 4IN 5YD LF STRL

## (undated) DEVICE — BLD CUT FORMLA AGGR 3.5MM

## (undated) DEVICE — DRSNG GZ PETROLTM XEROFORM CURAD 1X8IN STRL

## (undated) DEVICE — INTEGRATED CASSETTE TUBING, DO NOT USE IF PACKAGE IS DAMAGED: Brand: CROSSFLOW

## (undated) DEVICE — TOTAL ANTERIOR HIP-LF: Brand: MEDLINE INDUSTRIES, INC.

## (undated) DEVICE — KNEE ARTHROSCOPY-LF: Brand: MEDLINE INDUSTRIES, INC.

## (undated) DEVICE — BIPOLAR SEALER 23-112-1 AQM 6.0: Brand: AQUAMANTYS™

## (undated) DEVICE — 450 ML BOTTLE OF 0.05% CHLORHEXIDINE GLUCONATE IN 99.95% STERILE WATER FOR IRRIGATION, USP AND APPLICATOR.: Brand: IRRISEPT ANTIMICROBIAL WOUND LAVAGE

## (undated) DEVICE — ZIPPERED TOGA, PEEL-AWAY 3X LARGE: Brand: FLYTE, SURGICOOL

## (undated) DEVICE — UNDYED BRAIDED (POLYGLACTIN 910), SYNTHETIC ABSORBABLE SUTURE: Brand: COATED VICRYL